# Patient Record
Sex: FEMALE | Race: BLACK OR AFRICAN AMERICAN | NOT HISPANIC OR LATINO | URBAN - METROPOLITAN AREA
[De-identification: names, ages, dates, MRNs, and addresses within clinical notes are randomized per-mention and may not be internally consistent; named-entity substitution may affect disease eponyms.]

---

## 2017-08-01 ENCOUNTER — EMERGENCY (EMERGENCY)
Facility: HOSPITAL | Age: 82
LOS: 0 days | Discharge: HOME | End: 2017-08-01
Attending: EMERGENCY MEDICINE
Payer: MEDICARE

## 2017-08-01 VITALS
HEART RATE: 66 BPM | OXYGEN SATURATION: 98 % | DIASTOLIC BLOOD PRESSURE: 52 MMHG | SYSTOLIC BLOOD PRESSURE: 155 MMHG | RESPIRATION RATE: 17 BRPM | WEIGHT: 169.98 LBS | HEIGHT: 68 IN

## 2017-08-01 PROBLEM — Z00.00 ENCOUNTER FOR PREVENTIVE HEALTH EXAMINATION: Status: ACTIVE | Noted: 2017-08-01

## 2017-08-01 LAB
ALBUMIN SERPL ELPH-MCNC: 4 G/DL — SIGNIFICANT CHANGE UP (ref 3.3–5)
ALP SERPL-CCNC: 58 U/L — SIGNIFICANT CHANGE UP (ref 40–120)
ALT FLD-CCNC: 31 U/L — SIGNIFICANT CHANGE UP (ref 12–78)
ANION GAP SERPL CALC-SCNC: 9 MMOL/L — SIGNIFICANT CHANGE UP (ref 5–17)
ANISOCYTOSIS BLD QL: SLIGHT — SIGNIFICANT CHANGE UP
APTT BLD: 29.5 SEC — SIGNIFICANT CHANGE UP (ref 27.5–37.4)
AST SERPL-CCNC: 37 U/L — SIGNIFICANT CHANGE UP (ref 15–37)
BASO STIPL BLD QL SMEAR: PRESENT — SIGNIFICANT CHANGE UP
BILIRUB SERPL-MCNC: 0.2 MG/DL — SIGNIFICANT CHANGE UP (ref 0.2–1.2)
BUN SERPL-MCNC: 46 MG/DL — HIGH (ref 7–23)
CALCIUM SERPL-MCNC: 9.4 MG/DL — SIGNIFICANT CHANGE UP (ref 8.5–10.1)
CHLORIDE SERPL-SCNC: 103 MMOL/L — SIGNIFICANT CHANGE UP (ref 96–108)
CO2 SERPL-SCNC: 26 MMOL/L — SIGNIFICANT CHANGE UP (ref 22–31)
CREAT SERPL-MCNC: 2.05 MG/DL — HIGH (ref 0.5–1.3)
GLUCOSE SERPL-MCNC: 154 MG/DL — HIGH (ref 70–99)
HCT VFR BLD CALC: 30.2 % — LOW (ref 34.5–45)
HGB BLD-MCNC: 9.7 G/DL — LOW (ref 11.5–15.5)
HYPOCHROMIA BLD QL: SLIGHT — SIGNIFICANT CHANGE UP
INR BLD: 1.01 RATIO — SIGNIFICANT CHANGE UP (ref 0.88–1.16)
LACTATE SERPL-SCNC: 1 MMOL/L — SIGNIFICANT CHANGE UP (ref 0.7–2)
LYMPHOCYTES # BLD AUTO: 30 % — SIGNIFICANT CHANGE UP (ref 13–44)
MCHC RBC-ENTMCNC: 30 PG — SIGNIFICANT CHANGE UP (ref 27–34)
MCHC RBC-ENTMCNC: 32.2 GM/DL — SIGNIFICANT CHANGE UP (ref 32–36)
MCV RBC AUTO: 93 FL — SIGNIFICANT CHANGE UP (ref 80–100)
MONOCYTES NFR BLD AUTO: 3 % — SIGNIFICANT CHANGE UP (ref 2–14)
NEUTROPHILS NFR BLD AUTO: 67 % — SIGNIFICANT CHANGE UP (ref 43–77)
NT-PROBNP SERPL-SCNC: 152 PG/ML — SIGNIFICANT CHANGE UP (ref 0–450)
PLAT MORPH BLD: NORMAL — SIGNIFICANT CHANGE UP
PLATELET # BLD AUTO: 207 K/UL — SIGNIFICANT CHANGE UP (ref 150–400)
POLYCHROMASIA BLD QL SMEAR: SLIGHT — SIGNIFICANT CHANGE UP
POTASSIUM SERPL-MCNC: 4.9 MMOL/L — SIGNIFICANT CHANGE UP (ref 3.5–5.3)
POTASSIUM SERPL-SCNC: 4.9 MMOL/L — SIGNIFICANT CHANGE UP (ref 3.5–5.3)
PROT SERPL-MCNC: 7.8 GM/DL — SIGNIFICANT CHANGE UP (ref 6–8.3)
PROTHROM AB SERPL-ACNC: 11 SEC — SIGNIFICANT CHANGE UP (ref 9.8–12.7)
RBC # BLD: 3.25 M/UL — LOW (ref 3.8–5.2)
RBC # FLD: 14.6 % — SIGNIFICANT CHANGE UP (ref 11–15)
RBC BLD AUTO: ABNORMAL
SCHISTOCYTES BLD QL AUTO: SLIGHT — SIGNIFICANT CHANGE UP
SODIUM SERPL-SCNC: 138 MMOL/L — SIGNIFICANT CHANGE UP (ref 135–145)
SPHEROCYTES BLD QL SMEAR: SLIGHT — SIGNIFICANT CHANGE UP
WBC # BLD: 7.5 K/UL — SIGNIFICANT CHANGE UP (ref 3.8–10.5)
WBC # FLD AUTO: 7.5 K/UL — SIGNIFICANT CHANGE UP (ref 3.8–10.5)

## 2017-08-01 PROCEDURE — 99284 EMERGENCY DEPT VISIT MOD MDM: CPT

## 2017-08-01 PROCEDURE — 71010: CPT | Mod: 26

## 2017-08-01 NOTE — ED PROVIDER NOTE - OBJECTIVE STATEMENT
81 yo female with diabetes, hypertension presents with cough for 2 weeks. Patient denies fever, chills, abdominal pain, recent travel, smoking. +yellow sputum

## 2017-08-01 NOTE — ED ADULT TRIAGE NOTE - CHIEF COMPLAINT QUOTE
cough with yellow sputum for 1 week. Pt denies any chest pain or sob at triage. Pt c/o cramps to feet

## 2017-08-01 NOTE — ED ADULT NURSE NOTE - OBJECTIVE STATEMENT
pt states she has cough for the past three days, denies any fever or chills or sob, cough with whitish sputum.

## 2017-08-01 NOTE — ED PROVIDER NOTE - MEDICAL DECISION MAKING DETAILS
Patient with cough for over one week, renal insufficiency; patient encouraged to f/u with pmd for re evaluation

## 2017-08-01 NOTE — ED ADULT NURSE NOTE - ADDITIONAL PRINTED INSTRUCTIONS GIVEN
pt discharged home by Md, denies any pain at present, ambulatory with steady gait accompanied home by family member

## 2017-08-02 DIAGNOSIS — I10 ESSENTIAL (PRIMARY) HYPERTENSION: ICD-10-CM

## 2017-08-02 DIAGNOSIS — R05 COUGH: ICD-10-CM

## 2017-08-02 DIAGNOSIS — N28.9 DISORDER OF KIDNEY AND URETER, UNSPECIFIED: ICD-10-CM

## 2017-08-02 DIAGNOSIS — J40 BRONCHITIS, NOT SPECIFIED AS ACUTE OR CHRONIC: ICD-10-CM

## 2017-08-02 DIAGNOSIS — E11.9 TYPE 2 DIABETES MELLITUS WITHOUT COMPLICATIONS: ICD-10-CM

## 2017-08-06 LAB
CULTURE RESULTS: SIGNIFICANT CHANGE UP
CULTURE RESULTS: SIGNIFICANT CHANGE UP
SPECIMEN SOURCE: SIGNIFICANT CHANGE UP
SPECIMEN SOURCE: SIGNIFICANT CHANGE UP

## 2017-09-04 ENCOUNTER — EMERGENCY (EMERGENCY)
Facility: HOSPITAL | Age: 82
LOS: 0 days | Discharge: HOME | End: 2017-09-04
Attending: EMERGENCY MEDICINE
Payer: MEDICARE

## 2017-09-04 VITALS
SYSTOLIC BLOOD PRESSURE: 145 MMHG | HEART RATE: 74 BPM | TEMPERATURE: 98 F | OXYGEN SATURATION: 97 % | RESPIRATION RATE: 16 BRPM | WEIGHT: 169.98 LBS | DIASTOLIC BLOOD PRESSURE: 52 MMHG | HEIGHT: 68 IN

## 2017-09-04 DIAGNOSIS — I10 ESSENTIAL (PRIMARY) HYPERTENSION: ICD-10-CM

## 2017-09-04 DIAGNOSIS — R05 COUGH: ICD-10-CM

## 2017-09-04 DIAGNOSIS — E11.9 TYPE 2 DIABETES MELLITUS WITHOUT COMPLICATIONS: ICD-10-CM

## 2017-09-04 PROCEDURE — 99284 EMERGENCY DEPT VISIT MOD MDM: CPT

## 2017-09-04 PROCEDURE — 71020: CPT | Mod: 26

## 2017-09-04 RX ORDER — CETIRIZINE HYDROCHLORIDE 10 MG/1
1 TABLET ORAL
Qty: 30 | Refills: 0 | OUTPATIENT
Start: 2017-09-04 | End: 2017-10-04

## 2017-09-04 RX ORDER — IPRATROPIUM/ALBUTEROL SULFATE 18-103MCG
3 AEROSOL WITH ADAPTER (GRAM) INHALATION ONCE
Qty: 0 | Refills: 0 | Status: COMPLETED | OUTPATIENT
Start: 2017-09-04 | End: 2017-09-04

## 2017-09-04 RX ADMIN — Medication 3 MILLILITER(S): at 14:47

## 2017-09-04 RX ADMIN — Medication 60 MILLIGRAM(S): at 14:45

## 2017-09-04 NOTE — ED ADULT TRIAGE NOTE - CHIEF COMPLAINT QUOTE
Stated " I'm here for constant coughing , its sometimes green , sometimes yellow "   denies fever  or chest pain or sob

## 2017-11-06 ENCOUNTER — INPATIENT (INPATIENT)
Facility: HOSPITAL | Age: 82
LOS: 3 days | Discharge: HOME HEALTH | End: 2017-11-10
Attending: INTERNAL MEDICINE | Admitting: INTERNAL MEDICINE
Payer: MEDICARE

## 2017-11-06 VITALS
TEMPERATURE: 102 F | WEIGHT: 179.9 LBS | RESPIRATION RATE: 28 BRPM | HEIGHT: 68 IN | SYSTOLIC BLOOD PRESSURE: 148 MMHG | HEART RATE: 81 BPM | DIASTOLIC BLOOD PRESSURE: 51 MMHG | OXYGEN SATURATION: 97 %

## 2017-11-06 DIAGNOSIS — N18.9 CHRONIC KIDNEY DISEASE, UNSPECIFIED: ICD-10-CM

## 2017-11-06 DIAGNOSIS — N17.9 ACUTE KIDNEY FAILURE, UNSPECIFIED: ICD-10-CM

## 2017-11-06 DIAGNOSIS — E11.9 TYPE 2 DIABETES MELLITUS WITHOUT COMPLICATIONS: ICD-10-CM

## 2017-11-06 DIAGNOSIS — A41.9 SEPSIS, UNSPECIFIED ORGANISM: ICD-10-CM

## 2017-11-06 DIAGNOSIS — I10 ESSENTIAL (PRIMARY) HYPERTENSION: ICD-10-CM

## 2017-11-06 LAB
ALBUMIN SERPL ELPH-MCNC: 3.3 G/DL — SIGNIFICANT CHANGE UP (ref 3.3–5)
ALBUMIN SERPL ELPH-MCNC: 3.6 G/DL — SIGNIFICANT CHANGE UP (ref 3.3–5)
ALP SERPL-CCNC: 49 U/L — SIGNIFICANT CHANGE UP (ref 40–120)
ALP SERPL-CCNC: 51 U/L — SIGNIFICANT CHANGE UP (ref 40–120)
ALT FLD-CCNC: 23 U/L — SIGNIFICANT CHANGE UP (ref 12–78)
ALT FLD-CCNC: 28 U/L — SIGNIFICANT CHANGE UP (ref 12–78)
ANION GAP SERPL CALC-SCNC: 11 MMOL/L — SIGNIFICANT CHANGE UP (ref 5–17)
ANION GAP SERPL CALC-SCNC: 7 MMOL/L — SIGNIFICANT CHANGE UP (ref 5–17)
ANISOCYTOSIS BLD QL: SLIGHT — SIGNIFICANT CHANGE UP
APPEARANCE UR: CLEAR — SIGNIFICANT CHANGE UP
AST SERPL-CCNC: 23 U/L — SIGNIFICANT CHANGE UP (ref 15–37)
AST SERPL-CCNC: 30 U/L — SIGNIFICANT CHANGE UP (ref 15–37)
BACTERIA # UR AUTO: ABNORMAL
BASE EXCESS BLDA CALC-SCNC: -1.6 MMOL/L — SIGNIFICANT CHANGE UP (ref -2–2)
BILIRUB SERPL-MCNC: 0.4 MG/DL — SIGNIFICANT CHANGE UP (ref 0.2–1.2)
BILIRUB SERPL-MCNC: 0.4 MG/DL — SIGNIFICANT CHANGE UP (ref 0.2–1.2)
BILIRUB UR-MCNC: NEGATIVE — SIGNIFICANT CHANGE UP
BLOOD GAS COMMENTS: SIGNIFICANT CHANGE UP
BLOOD GAS COMMENTS: SIGNIFICANT CHANGE UP
BLOOD GAS SOURCE: SIGNIFICANT CHANGE UP
BUN SERPL-MCNC: 37 MG/DL — HIGH (ref 7–23)
BUN SERPL-MCNC: 46 MG/DL — HIGH (ref 7–23)
CALCIUM SERPL-MCNC: 8.2 MG/DL — LOW (ref 8.5–10.1)
CALCIUM SERPL-MCNC: 8.9 MG/DL — SIGNIFICANT CHANGE UP (ref 8.5–10.1)
CHLORIDE SERPL-SCNC: 101 MMOL/L — SIGNIFICANT CHANGE UP (ref 96–108)
CHLORIDE SERPL-SCNC: 104 MMOL/L — SIGNIFICANT CHANGE UP (ref 96–108)
CK MB BLD-MCNC: 0.9 % — SIGNIFICANT CHANGE UP (ref 0–3.5)
CK MB CFR SERPL CALC: 1.6 NG/ML — SIGNIFICANT CHANGE UP (ref 0.5–3.6)
CK MB CFR SERPL CALC: <0.5 NG/ML — SIGNIFICANT CHANGE UP (ref 0.5–3.6)
CK SERPL-CCNC: 181 U/L — SIGNIFICANT CHANGE UP (ref 26–192)
CO2 SERPL-SCNC: 24 MMOL/L — SIGNIFICANT CHANGE UP (ref 22–31)
CO2 SERPL-SCNC: 28 MMOL/L — SIGNIFICANT CHANGE UP (ref 22–31)
COLOR SPEC: YELLOW — SIGNIFICANT CHANGE UP
CREAT SERPL-MCNC: 2.13 MG/DL — HIGH (ref 0.5–1.3)
CREAT SERPL-MCNC: 2.23 MG/DL — HIGH (ref 0.5–1.3)
DIFF PNL FLD: ABNORMAL
EPI CELLS # UR: SIGNIFICANT CHANGE UP
FLUAV SPEC QL CULT: NEGATIVE — SIGNIFICANT CHANGE UP
FLUBV AG SPEC QL IA: NEGATIVE — SIGNIFICANT CHANGE UP
GLUCOSE BLDC GLUCOMTR-MCNC: 113 MG/DL — HIGH (ref 70–99)
GLUCOSE BLDC GLUCOMTR-MCNC: 133 MG/DL — HIGH (ref 70–99)
GLUCOSE BLDC GLUCOMTR-MCNC: 150 MG/DL — HIGH (ref 70–99)
GLUCOSE BLDC GLUCOMTR-MCNC: 198 MG/DL — HIGH (ref 70–99)
GLUCOSE SERPL-MCNC: 121 MG/DL — HIGH (ref 70–99)
GLUCOSE SERPL-MCNC: 162 MG/DL — HIGH (ref 70–99)
GLUCOSE UR QL: NEGATIVE MG/DL — SIGNIFICANT CHANGE UP
HCO3 BLDA-SCNC: 21 MMOL/L — SIGNIFICANT CHANGE UP (ref 21–29)
HCT VFR BLD CALC: 28.5 % — LOW (ref 34.5–45)
HCT VFR BLD CALC: 30.2 % — LOW (ref 34.5–45)
HGB BLD-MCNC: 9.3 G/DL — LOW (ref 11.5–15.5)
HGB BLD-MCNC: 9.8 G/DL — LOW (ref 11.5–15.5)
HOROWITZ INDEX BLDA+IHG-RTO: 32 — SIGNIFICANT CHANGE UP
HYPOCHROMIA BLD QL: SLIGHT — SIGNIFICANT CHANGE UP
KETONES UR-MCNC: NEGATIVE — SIGNIFICANT CHANGE UP
LACTATE SERPL-SCNC: 0.9 MMOL/L — SIGNIFICANT CHANGE UP (ref 0.7–2)
LACTATE SERPL-SCNC: 2.4 MMOL/L — HIGH (ref 0.7–2)
LACTATE SERPL-SCNC: 2.7 MMOL/L — HIGH (ref 0.7–2)
LEUKOCYTE ESTERASE UR-ACNC: ABNORMAL
LIDOCAIN IGE QN: 155 U/L — SIGNIFICANT CHANGE UP (ref 73–393)
LIDOCAIN IGE QN: 158 U/L — SIGNIFICANT CHANGE UP (ref 73–393)
LYMPHOCYTES # BLD AUTO: 7 % — LOW (ref 13–44)
MACROCYTES BLD QL: SLIGHT — SIGNIFICANT CHANGE UP
MAGNESIUM SERPL-MCNC: 2 MG/DL — SIGNIFICANT CHANGE UP (ref 1.6–2.6)
MAGNESIUM SERPL-MCNC: 2.3 MG/DL — SIGNIFICANT CHANGE UP (ref 1.6–2.6)
MCHC RBC-ENTMCNC: 30 PG — SIGNIFICANT CHANGE UP (ref 27–34)
MCHC RBC-ENTMCNC: 30.1 PG — SIGNIFICANT CHANGE UP (ref 27–34)
MCHC RBC-ENTMCNC: 32.4 GM/DL — SIGNIFICANT CHANGE UP (ref 32–36)
MCHC RBC-ENTMCNC: 32.8 GM/DL — SIGNIFICANT CHANGE UP (ref 32–36)
MCV RBC AUTO: 91.8 FL — SIGNIFICANT CHANGE UP (ref 80–100)
MCV RBC AUTO: 92.6 FL — SIGNIFICANT CHANGE UP (ref 80–100)
MONOCYTES NFR BLD AUTO: 3 % — SIGNIFICANT CHANGE UP (ref 2–14)
NEUTROPHILS NFR BLD AUTO: 85 % — HIGH (ref 43–77)
NEUTS BAND # BLD: 5 % — SIGNIFICANT CHANGE UP (ref 0–8)
NITRITE UR-MCNC: NEGATIVE — SIGNIFICANT CHANGE UP
OVALOCYTES BLD QL SMEAR: SLIGHT — SIGNIFICANT CHANGE UP
PCO2 BLDA: 29 MMHG — LOW (ref 32–46)
PH BLD: 7.47 — HIGH (ref 7.35–7.45)
PH UR: 7 — SIGNIFICANT CHANGE UP (ref 5–8)
PHOSPHATE SERPL-MCNC: 2.5 MG/DL — SIGNIFICANT CHANGE UP (ref 2.5–4.5)
PLAT MORPH BLD: NORMAL — SIGNIFICANT CHANGE UP
PLATELET # BLD AUTO: 146 K/UL — LOW (ref 150–400)
PLATELET # BLD AUTO: 161 K/UL — SIGNIFICANT CHANGE UP (ref 150–400)
PO2 BLDA: 101 MMHG — SIGNIFICANT CHANGE UP (ref 74–108)
POLYCHROMASIA BLD QL SMEAR: SLIGHT — SIGNIFICANT CHANGE UP
POTASSIUM SERPL-MCNC: 3.8 MMOL/L — SIGNIFICANT CHANGE UP (ref 3.5–5.3)
POTASSIUM SERPL-MCNC: 4.1 MMOL/L — SIGNIFICANT CHANGE UP (ref 3.5–5.3)
POTASSIUM SERPL-SCNC: 3.8 MMOL/L — SIGNIFICANT CHANGE UP (ref 3.5–5.3)
POTASSIUM SERPL-SCNC: 4.1 MMOL/L — SIGNIFICANT CHANGE UP (ref 3.5–5.3)
PROT SERPL-MCNC: 7.4 GM/DL — SIGNIFICANT CHANGE UP (ref 6–8.3)
PROT SERPL-MCNC: 7.5 GM/DL — SIGNIFICANT CHANGE UP (ref 6–8.3)
PROT UR-MCNC: 30 MG/DL
RBC # BLD: 3.1 M/UL — LOW (ref 3.8–5.2)
RBC # BLD: 3.26 M/UL — LOW (ref 3.8–5.2)
RBC # FLD: 15.1 % — HIGH (ref 11–15)
RBC # FLD: 15.1 % — HIGH (ref 11–15)
RBC BLD AUTO: ABNORMAL
RBC CASTS # UR COMP ASSIST: SIGNIFICANT CHANGE UP /HPF (ref 0–4)
SAO2 % BLDA: 98 % — HIGH (ref 92–96)
SODIUM SERPL-SCNC: 136 MMOL/L — SIGNIFICANT CHANGE UP (ref 135–145)
SODIUM SERPL-SCNC: 139 MMOL/L — SIGNIFICANT CHANGE UP (ref 135–145)
SP GR SPEC: 1 — LOW (ref 1.01–1.02)
TROPONIN I SERPL-MCNC: 0.11 NG/ML — HIGH (ref 0.01–0.04)
TROPONIN I SERPL-MCNC: 0.74 NG/ML — HIGH (ref 0.01–0.04)
TROPONIN I SERPL-MCNC: 0.94 NG/ML — HIGH (ref 0.01–0.04)
UROBILINOGEN FLD QL: NEGATIVE MG/DL — SIGNIFICANT CHANGE UP
WBC # BLD: 8.6 K/UL — SIGNIFICANT CHANGE UP (ref 3.8–10.5)
WBC # BLD: 8.9 K/UL — SIGNIFICANT CHANGE UP (ref 3.8–10.5)
WBC # FLD AUTO: 8.6 K/UL — SIGNIFICANT CHANGE UP (ref 3.8–10.5)
WBC # FLD AUTO: 8.9 K/UL — SIGNIFICANT CHANGE UP (ref 3.8–10.5)
WBC UR QL: ABNORMAL

## 2017-11-06 PROCEDURE — 99285 EMERGENCY DEPT VISIT HI MDM: CPT | Mod: 25

## 2017-11-06 PROCEDURE — 99223 1ST HOSP IP/OBS HIGH 75: CPT

## 2017-11-06 PROCEDURE — 71010: CPT | Mod: 26

## 2017-11-06 PROCEDURE — 93010 ELECTROCARDIOGRAM REPORT: CPT

## 2017-11-06 RX ORDER — ASPIRIN/CALCIUM CARB/MAGNESIUM 324 MG
325 TABLET ORAL ONCE
Qty: 0 | Refills: 0 | Status: COMPLETED | OUTPATIENT
Start: 2017-11-06 | End: 2017-11-06

## 2017-11-06 RX ORDER — SODIUM CHLORIDE 9 MG/ML
1000 INJECTION INTRAMUSCULAR; INTRAVENOUS; SUBCUTANEOUS
Qty: 0 | Refills: 0 | Status: DISCONTINUED | OUTPATIENT
Start: 2017-11-06 | End: 2017-11-08

## 2017-11-06 RX ORDER — ACETAMINOPHEN 500 MG
650 TABLET ORAL ONCE
Qty: 0 | Refills: 0 | Status: COMPLETED | OUTPATIENT
Start: 2017-11-06 | End: 2017-11-06

## 2017-11-06 RX ORDER — GABAPENTIN 400 MG/1
600 CAPSULE ORAL
Qty: 0 | Refills: 0 | Status: DISCONTINUED | OUTPATIENT
Start: 2017-11-06 | End: 2017-11-10

## 2017-11-06 RX ORDER — INSULIN LISPRO 100/ML
VIAL (ML) SUBCUTANEOUS
Qty: 0 | Refills: 0 | Status: DISCONTINUED | OUTPATIENT
Start: 2017-11-06 | End: 2017-11-10

## 2017-11-06 RX ORDER — DEXTROSE 50 % IN WATER 50 %
25 SYRINGE (ML) INTRAVENOUS ONCE
Qty: 0 | Refills: 0 | Status: DISCONTINUED | OUTPATIENT
Start: 2017-11-06 | End: 2017-11-10

## 2017-11-06 RX ORDER — PIPERACILLIN AND TAZOBACTAM 4; .5 G/20ML; G/20ML
3.38 INJECTION, POWDER, LYOPHILIZED, FOR SOLUTION INTRAVENOUS ONCE
Qty: 0 | Refills: 0 | Status: COMPLETED | OUTPATIENT
Start: 2017-11-06 | End: 2017-11-06

## 2017-11-06 RX ORDER — AMLODIPINE BESYLATE 2.5 MG/1
5 TABLET ORAL DAILY
Qty: 0 | Refills: 0 | Status: DISCONTINUED | OUTPATIENT
Start: 2017-11-06 | End: 2017-11-06

## 2017-11-06 RX ORDER — VANCOMYCIN HCL 1 G
1000 VIAL (EA) INTRAVENOUS ONCE
Qty: 0 | Refills: 0 | Status: COMPLETED | OUTPATIENT
Start: 2017-11-06 | End: 2017-11-06

## 2017-11-06 RX ORDER — SODIUM CHLORIDE 9 MG/ML
1000 INJECTION, SOLUTION INTRAVENOUS
Qty: 0 | Refills: 0 | Status: DISCONTINUED | OUTPATIENT
Start: 2017-11-06 | End: 2017-11-10

## 2017-11-06 RX ORDER — AMLODIPINE BESYLATE 2.5 MG/1
5 TABLET ORAL DAILY
Qty: 0 | Refills: 0 | Status: DISCONTINUED | OUTPATIENT
Start: 2017-11-06 | End: 2017-11-10

## 2017-11-06 RX ORDER — INSULIN LISPRO 100/ML
VIAL (ML) SUBCUTANEOUS AT BEDTIME
Qty: 0 | Refills: 0 | Status: DISCONTINUED | OUTPATIENT
Start: 2017-11-06 | End: 2017-11-10

## 2017-11-06 RX ORDER — PIPERACILLIN AND TAZOBACTAM 4; .5 G/20ML; G/20ML
3.38 INJECTION, POWDER, LYOPHILIZED, FOR SOLUTION INTRAVENOUS EVERY 12 HOURS
Qty: 0 | Refills: 0 | Status: DISCONTINUED | OUTPATIENT
Start: 2017-11-06 | End: 2017-11-08

## 2017-11-06 RX ORDER — LOSARTAN POTASSIUM 100 MG/1
100 TABLET, FILM COATED ORAL DAILY
Qty: 0 | Refills: 0 | Status: DISCONTINUED | OUTPATIENT
Start: 2017-11-06 | End: 2017-11-10

## 2017-11-06 RX ORDER — GLUCAGON INJECTION, SOLUTION 0.5 MG/.1ML
1 INJECTION, SOLUTION SUBCUTANEOUS ONCE
Qty: 0 | Refills: 0 | Status: DISCONTINUED | OUTPATIENT
Start: 2017-11-06 | End: 2017-11-10

## 2017-11-06 RX ORDER — HEPARIN SODIUM 5000 [USP'U]/ML
5000 INJECTION INTRAVENOUS; SUBCUTANEOUS EVERY 12 HOURS
Qty: 0 | Refills: 0 | Status: DISCONTINUED | OUTPATIENT
Start: 2017-11-06 | End: 2017-11-10

## 2017-11-06 RX ORDER — SODIUM CHLORIDE 9 MG/ML
2500 INJECTION INTRAMUSCULAR; INTRAVENOUS; SUBCUTANEOUS ONCE
Qty: 0 | Refills: 0 | Status: COMPLETED | OUTPATIENT
Start: 2017-11-06 | End: 2017-11-06

## 2017-11-06 RX ORDER — INFLUENZA VIRUS VACCINE 15; 15; 15; 15 UG/.5ML; UG/.5ML; UG/.5ML; UG/.5ML
0.5 SUSPENSION INTRAMUSCULAR ONCE
Qty: 0 | Refills: 0 | Status: COMPLETED | OUTPATIENT
Start: 2017-11-06 | End: 2017-11-10

## 2017-11-06 RX ORDER — ACETAMINOPHEN 500 MG
650 TABLET ORAL EVERY 6 HOURS
Qty: 0 | Refills: 0 | Status: DISCONTINUED | OUTPATIENT
Start: 2017-11-06 | End: 2017-11-10

## 2017-11-06 RX ORDER — ASPIRIN/CALCIUM CARB/MAGNESIUM 324 MG
81 TABLET ORAL DAILY
Qty: 0 | Refills: 0 | Status: DISCONTINUED | OUTPATIENT
Start: 2017-11-06 | End: 2017-11-10

## 2017-11-06 RX ORDER — DEXTROSE 50 % IN WATER 50 %
12.5 SYRINGE (ML) INTRAVENOUS ONCE
Qty: 0 | Refills: 0 | Status: DISCONTINUED | OUTPATIENT
Start: 2017-11-06 | End: 2017-11-10

## 2017-11-06 RX ORDER — LOSARTAN POTASSIUM 100 MG/1
100 TABLET, FILM COATED ORAL DAILY
Qty: 0 | Refills: 0 | Status: DISCONTINUED | OUTPATIENT
Start: 2017-11-06 | End: 2017-11-06

## 2017-11-06 RX ORDER — ACETAMINOPHEN 500 MG
1000 TABLET ORAL ONCE
Qty: 0 | Refills: 0 | Status: COMPLETED | OUTPATIENT
Start: 2017-11-06 | End: 2017-11-06

## 2017-11-06 RX ORDER — PENTOXIFYLLINE 400 MG
400 TABLET, EXTENDED RELEASE ORAL THREE TIMES A DAY
Qty: 0 | Refills: 0 | Status: DISCONTINUED | OUTPATIENT
Start: 2017-11-06 | End: 2017-11-10

## 2017-11-06 RX ORDER — DEXTROSE 50 % IN WATER 50 %
1 SYRINGE (ML) INTRAVENOUS ONCE
Qty: 0 | Refills: 0 | Status: DISCONTINUED | OUTPATIENT
Start: 2017-11-06 | End: 2017-11-10

## 2017-11-06 RX ADMIN — AMLODIPINE BESYLATE 5 MILLIGRAM(S): 2.5 TABLET ORAL at 18:41

## 2017-11-06 RX ADMIN — Medication 250 MILLIGRAM(S): at 11:14

## 2017-11-06 RX ADMIN — HEPARIN SODIUM 5000 UNIT(S): 5000 INJECTION INTRAVENOUS; SUBCUTANEOUS at 18:40

## 2017-11-06 RX ADMIN — SODIUM CHLORIDE 100 MILLILITER(S): 9 INJECTION INTRAMUSCULAR; INTRAVENOUS; SUBCUTANEOUS at 23:28

## 2017-11-06 RX ADMIN — Medication 400 MILLIGRAM(S): at 21:50

## 2017-11-06 RX ADMIN — PIPERACILLIN AND TAZOBACTAM 200 GRAM(S): 4; .5 INJECTION, POWDER, LYOPHILIZED, FOR SOLUTION INTRAVENOUS at 09:59

## 2017-11-06 RX ADMIN — Medication 650 MILLIGRAM(S): at 08:29

## 2017-11-06 RX ADMIN — Medication 400 MILLIGRAM(S): at 13:53

## 2017-11-06 RX ADMIN — SODIUM CHLORIDE 1250 MILLILITER(S): 9 INJECTION INTRAMUSCULAR; INTRAVENOUS; SUBCUTANEOUS at 08:00

## 2017-11-06 RX ADMIN — SODIUM CHLORIDE 100 MILLILITER(S): 9 INJECTION INTRAMUSCULAR; INTRAVENOUS; SUBCUTANEOUS at 13:52

## 2017-11-06 RX ADMIN — Medication 81 MILLIGRAM(S): at 18:41

## 2017-11-06 RX ADMIN — Medication 325 MILLIGRAM(S): at 09:59

## 2017-11-06 RX ADMIN — Medication: at 18:40

## 2017-11-06 RX ADMIN — GABAPENTIN 600 MILLIGRAM(S): 400 CAPSULE ORAL at 18:39

## 2017-11-06 RX ADMIN — Medication 400 MILLIGRAM(S): at 23:22

## 2017-11-06 RX ADMIN — PIPERACILLIN AND TAZOBACTAM 25 GRAM(S): 4; .5 INJECTION, POWDER, LYOPHILIZED, FOR SOLUTION INTRAVENOUS at 17:30

## 2017-11-06 NOTE — ED PROVIDER NOTE - PROGRESS NOTE DETAILS
Pt is more alert after fluids. ST depression seen on monitor, repeat ecg shows St depressions. Transfer center Cardiology paged for evolving ECG and trop 0.113. Given creatinine level and infectious source, cardiology recommends trending ecg and troponins for likely demand ischemia, no need for urgent transfer or cath at this time. Pt admitted to medicine for further management.

## 2017-11-06 NOTE — H&P ADULT - NSHPREVIEWOFSYSTEMS_GEN_ALL_CORE
CONSTITUTIONAL: No fever, weight loss, or fatigue  EYES: No eye pain, visual disturbances, or discharge  ENMT:  No difficulty hearing, tinnitus, vertigo; No sinus or throat pain  NECK: No pain or stiffness  RESPIRATORY: No cough, wheezing, chills or hemoptysis; No shortness of breath  CARDIOVASCULAR: two days of  chest pain,no  palpitations, dizziness, or leg swelling  GASTROINTESTINAL: No abdominal or epigastric pain. No nausea, vomiting, or hematemesis; No diarrhea or constipation. No melena or hematochezia.  GENITOURINARY: No dysuria, frequency, hematuria, or incontinence  NEUROLOGICAL: No headaches, memory loss, loss of strength, numbness, or tremors  SKIN: No itching, burning, rashes, or lesions   LYMPH NODES: No enlarged glands  ENDOCRINE: No heat or cold intolerance; No hair loss  MUSCULOSKELETAL: No joint pain or swelling; No muscle, back, or extremity pain  PSYCHIATRIC: No depression, anxiety, mood swings, or difficulty sleeping  HEME/LYMPH: No easy bruising, or bleeding gums  ALLERGY AND IMMUNOLOGIC: No hives or eczema

## 2017-11-06 NOTE — ED PROVIDER NOTE - OBJECTIVE STATEMENT
Pt is an 81 yo lady with a pmhx of HTN, HL, IDDM2 who presents to the ED with cough, and fever since yesterday. Has had chest pain from yesterday as well, on L. chest, nonradiating 7/10. No hx of dvt/pe, no leg swelling. Daughter says pt is less responsive. Fever at home, took tylenol yesterday. No abdominal pain, no difficulty breathing. Coughing, nonproductive. No dysuria, no hx of CAD.

## 2017-11-06 NOTE — PHARMACY COMMUNICATION NOTE - COMMENTS
MD wants 600mg bid. I explained the crcl and scr and according to utdol should be 200-700mg daily. MD wants 600mg bid and says that that her normal renal function is at baseline for her and wants order proceeses.

## 2017-11-06 NOTE — H&P ADULT - NSHPLABSRESULTS_GEN_ALL_CORE
9.8    8.9   )-----------( 161      ( 06 Nov 2017 08:17 )             30.2   11-06    136  |  101  |  46<H>  ----------------------------<  162<H>  4.1   |  28  |  2.23<H>    Ca    8.9      06 Nov 2017 08:17  Mg     2.3     11-06    TPro  7.5  /  Alb  3.6  /  TBili  0.4  /  DBili  x   /  AST  23  /  ALT  23  /  AlkPhos  51  11-06  Urine Microscopic-Add On (NC) (11.06.17 @ 09:21)    Bacteria: Moderate    Epithelial Cells: Few    Red Blood Cell - Urine: 0-2 /HPF    White Blood Cell - Urine: 6-10  < from: Xray Chest 1 View AP/PA. (11.06.17 @ 08:33) >    Cardiomegaly. Clear lungs.

## 2017-11-06 NOTE — CHART NOTE - NSCHARTNOTEFT_GEN_A_CORE
Medicine Hospitalist PA    81 y/o female admitted 11/06/2017 for sepsis. RRT called for hypoxemia at 85%. Upon arrival, patient was complained of having chills with RUQ pain 2/2 to chills. Denied headache, chest pain, SOB.     Vital rechecked  T: 100.3F rectal  BP: 181/41  HR: 90  RR: 24  Pulse Ox: 85%    General: A+Ox3, shaking chills  Cardio: S1S2 regular rate/rhythm  Resp: Tachypneic. Good air entry B/L. No rales, rhonchi, wheezes.  Abd: Soft NTND +BS    A&P  81 y/o female a pmhx of HTN, HL, IDDM2 admitted with sepsis 2/2 ?UTI.   -nasal cannula @3L started  -IV tylenol started   -resolved chills and RUQ pain  -labs ordered. f/u results  -trops elevated. f/u cardiac enzymes  -ABG to confirm oxygenation/saturation at 98%  -BCx and UCx pending

## 2017-11-06 NOTE — H&P ADULT - HISTORY OF PRESENT ILLNESS
Pt is an 83 yo lady with a pmhx of HTN, HL, IDDM2 who presents to the ED with cough, and fever since yesterday. Has had chest pain from yesterday as well, on L. chest, nonradiating 7/10. No hx of dvt/pe, no leg swelling. Daughter says pt is less responsive. Fever at home, took tylenol yesterday. No abdominal pain, no difficulty breathing. Coughing, nonproductive. No dysuria, no hx of CAD.

## 2017-11-06 NOTE — H&P ADULT - ASSESSMENT
82f with fever and episode of lethargy who has responded to fluids and antibiotics   IMPROVE VTE Individual Risk Assessment          RISK                                                          Points    [  ] Previous VTE                                                3    [  ] Thrombophilia                                             2    [  ] Lower limb paralysis                                    2        (unable to hold up >15 seconds)      [  ] Current Cancer                                             2         (within 6 months)    [  ] Immobilization > 24 hrs                              1  x  [  ] ICU/CCU stay > 24 hours                            1    [ x ] Age > 60                                                    1    IMPROVE VTE Score  ____2_____

## 2017-11-07 DIAGNOSIS — I25.9 CHRONIC ISCHEMIC HEART DISEASE, UNSPECIFIED: ICD-10-CM

## 2017-11-07 DIAGNOSIS — R78.81 BACTEREMIA: ICD-10-CM

## 2017-11-07 DIAGNOSIS — R53.1 WEAKNESS: ICD-10-CM

## 2017-11-07 DIAGNOSIS — A41.51 SEPSIS DUE TO ESCHERICHIA COLI [E. COLI]: ICD-10-CM

## 2017-11-07 DIAGNOSIS — Z29.9 ENCOUNTER FOR PROPHYLACTIC MEASURES, UNSPECIFIED: ICD-10-CM

## 2017-11-07 DIAGNOSIS — N30.01 ACUTE CYSTITIS WITH HEMATURIA: ICD-10-CM

## 2017-11-07 DIAGNOSIS — N30.00 ACUTE CYSTITIS WITHOUT HEMATURIA: ICD-10-CM

## 2017-11-07 LAB
ANION GAP SERPL CALC-SCNC: 8 MMOL/L — SIGNIFICANT CHANGE UP (ref 5–17)
BUN SERPL-MCNC: 28 MG/DL — HIGH (ref 7–23)
CALCIUM SERPL-MCNC: 8.5 MG/DL — SIGNIFICANT CHANGE UP (ref 8.5–10.1)
CHLORIDE SERPL-SCNC: 105 MMOL/L — SIGNIFICANT CHANGE UP (ref 96–108)
CO2 SERPL-SCNC: 25 MMOL/L — SIGNIFICANT CHANGE UP (ref 22–31)
CREAT SERPL-MCNC: 1.69 MG/DL — HIGH (ref 0.5–1.3)
E COLI DNA BLD POS QL NAA+NON-PROBE: SIGNIFICANT CHANGE UP
GLUCOSE BLDC GLUCOMTR-MCNC: 110 MG/DL — HIGH (ref 70–99)
GLUCOSE BLDC GLUCOMTR-MCNC: 148 MG/DL — HIGH (ref 70–99)
GLUCOSE BLDC GLUCOMTR-MCNC: 167 MG/DL — HIGH (ref 70–99)
GLUCOSE BLDC GLUCOMTR-MCNC: 169 MG/DL — HIGH (ref 70–99)
GLUCOSE SERPL-MCNC: 130 MG/DL — HIGH (ref 70–99)
GRAM STN FLD: SIGNIFICANT CHANGE UP
HBA1C BLD-MCNC: 6.5 % — HIGH (ref 4–5.6)
HCT VFR BLD CALC: 29.4 % — LOW (ref 34.5–45)
HGB BLD-MCNC: 9.6 G/DL — LOW (ref 11.5–15.5)
MCHC RBC-ENTMCNC: 29.9 PG — SIGNIFICANT CHANGE UP (ref 27–34)
MCHC RBC-ENTMCNC: 32.7 GM/DL — SIGNIFICANT CHANGE UP (ref 32–36)
MCV RBC AUTO: 91.3 FL — SIGNIFICANT CHANGE UP (ref 80–100)
METHOD TYPE: SIGNIFICANT CHANGE UP
PLATELET # BLD AUTO: 172 K/UL — SIGNIFICANT CHANGE UP (ref 150–400)
POTASSIUM SERPL-MCNC: 3.7 MMOL/L — SIGNIFICANT CHANGE UP (ref 3.5–5.3)
POTASSIUM SERPL-SCNC: 3.7 MMOL/L — SIGNIFICANT CHANGE UP (ref 3.5–5.3)
PROCALCITONIN SERPL-MCNC: 14.89 NG/ML — HIGH (ref 0–0.04)
RBC # BLD: 3.21 M/UL — LOW (ref 3.8–5.2)
RBC # FLD: 15 % — SIGNIFICANT CHANGE UP (ref 11–15)
SODIUM SERPL-SCNC: 138 MMOL/L — SIGNIFICANT CHANGE UP (ref 135–145)
SPECIMEN SOURCE: SIGNIFICANT CHANGE UP
TROPONIN I SERPL-MCNC: 0.85 NG/ML — HIGH (ref 0.01–0.04)
TROPONIN I SERPL-MCNC: 1.03 NG/ML — HIGH (ref 0.01–0.04)
WBC # BLD: 8.3 K/UL — SIGNIFICANT CHANGE UP (ref 3.8–10.5)
WBC # FLD AUTO: 8.3 K/UL — SIGNIFICANT CHANGE UP (ref 3.8–10.5)

## 2017-11-07 PROCEDURE — 99223 1ST HOSP IP/OBS HIGH 75: CPT

## 2017-11-07 PROCEDURE — 99233 SBSQ HOSP IP/OBS HIGH 50: CPT

## 2017-11-07 PROCEDURE — 99232 SBSQ HOSP IP/OBS MODERATE 35: CPT

## 2017-11-07 PROCEDURE — 76700 US EXAM ABDOM COMPLETE: CPT | Mod: 26

## 2017-11-07 RX ORDER — VANCOMYCIN HCL 1 G
1250 VIAL (EA) INTRAVENOUS ONCE
Qty: 0 | Refills: 0 | Status: COMPLETED | OUTPATIENT
Start: 2017-11-07 | End: 2017-11-07

## 2017-11-07 RX ORDER — METOPROLOL TARTRATE 50 MG
25 TABLET ORAL
Qty: 0 | Refills: 0 | Status: DISCONTINUED | OUTPATIENT
Start: 2017-11-07 | End: 2017-11-10

## 2017-11-07 RX ORDER — VANCOMYCIN HCL 1 G
VIAL (EA) INTRAVENOUS
Qty: 0 | Refills: 0 | Status: DISCONTINUED | OUTPATIENT
Start: 2017-11-07 | End: 2017-11-07

## 2017-11-07 RX ORDER — VANCOMYCIN HCL 1 G
1250 VIAL (EA) INTRAVENOUS ONCE
Qty: 0 | Refills: 0 | Status: DISCONTINUED | OUTPATIENT
Start: 2017-11-07 | End: 2017-11-07

## 2017-11-07 RX ORDER — VANCOMYCIN HCL 1 G
VIAL (EA) INTRAVENOUS
Qty: 0 | Refills: 0 | Status: DISCONTINUED | OUTPATIENT
Start: 2017-11-07 | End: 2017-11-08

## 2017-11-07 RX ADMIN — Medication 81 MILLIGRAM(S): at 11:26

## 2017-11-07 RX ADMIN — PIPERACILLIN AND TAZOBACTAM 25 GRAM(S): 4; .5 INJECTION, POWDER, LYOPHILIZED, FOR SOLUTION INTRAVENOUS at 06:39

## 2017-11-07 RX ADMIN — AMLODIPINE BESYLATE 5 MILLIGRAM(S): 2.5 TABLET ORAL at 06:41

## 2017-11-07 RX ADMIN — HEPARIN SODIUM 5000 UNIT(S): 5000 INJECTION INTRAVENOUS; SUBCUTANEOUS at 06:40

## 2017-11-07 RX ADMIN — HEPARIN SODIUM 5000 UNIT(S): 5000 INJECTION INTRAVENOUS; SUBCUTANEOUS at 17:40

## 2017-11-07 RX ADMIN — LOSARTAN POTASSIUM 100 MILLIGRAM(S): 100 TABLET, FILM COATED ORAL at 06:40

## 2017-11-07 RX ADMIN — PIPERACILLIN AND TAZOBACTAM 25 GRAM(S): 4; .5 INJECTION, POWDER, LYOPHILIZED, FOR SOLUTION INTRAVENOUS at 18:40

## 2017-11-07 RX ADMIN — Medication 650 MILLIGRAM(S): at 16:42

## 2017-11-07 RX ADMIN — Medication 166.67 MILLIGRAM(S): at 17:39

## 2017-11-07 RX ADMIN — GABAPENTIN 600 MILLIGRAM(S): 400 CAPSULE ORAL at 17:40

## 2017-11-07 RX ADMIN — Medication 400 MILLIGRAM(S): at 13:53

## 2017-11-07 RX ADMIN — Medication 400 MILLIGRAM(S): at 06:41

## 2017-11-07 RX ADMIN — Medication 400 MILLIGRAM(S): at 23:00

## 2017-11-07 RX ADMIN — GABAPENTIN 600 MILLIGRAM(S): 400 CAPSULE ORAL at 06:41

## 2017-11-07 NOTE — CONSULT NOTE ADULT - SUBJECTIVE AND OBJECTIVE BOX
Infectious Diseases - Attending at Dr. Vail    HPI:  Pt is an 83 yo lady with a pmhx of HTN, HL, IDDM2 who presents to the ED with cough  and fever since yesterday. Has had chest pain from yesterday as well, on L. chest, nonradiating 7/10. No hx of dvt/pe, no leg swelling. Daughter says pt is less responsive. Fever at home, took tylenol yesterday. No abdominal pain, no difficulty breathing. Coughing, nonproductive. No dysuria, no hx of CAD. (2017 12:55)  At present  pt says she had fever and son in law mentions she had confusion .She denies any other symptoms at all right now. Found to be bacteremic  No UTI symptoms      PAST MEDICAL & SURGICAL HISTORY:  HTN (hypertension)  Diabetes  No significant past surgical history      Allergies    No Known Allergies    Intolerances        FAMILY HISTORY:  No pertinent family history in first degree relatives      SOCIAL HISTORY:no smoking    REVIEW OF SYSTEMS:    Constitutional: + fever, no weight loss or fatigue  Eyes: No eye pain, visual disturbances, or discharge  ENT:  No difficulty hearing, tinnitus, vertigo; No sinus or throat pain  Neck: No pain or stiffness  Respiratory: No cough, wheezing, chills or hemoptysis  Cardiovascular: No chest pain, palpitations, shortness of breath, dizziness or leg swelling  Gastrointestinal: No abdominal or epigastric pain. No nausea, vomiting or hematemesis; No diarrhea or constipation. No melena or hematochezia.  Genitourinary: No dysuria, frequency, hematuria or incontinence  Neurological:+ confusion (per family )  No headaches, memory loss, loss of strength, numbness or tremors  Skin: No itching, burning, rashes or lesions       MEDICATIONS  (STANDING):  amLODIPine   Tablet 5 milliGRAM(s) Oral daily  aspirin enteric coated 81 milliGRAM(s) Oral daily  dextrose 5%. 1000 milliLiter(s) (50 mL/Hr) IV Continuous <Continuous>  dextrose 50% Injectable 12.5 Gram(s) IV Push once  dextrose 50% Injectable 25 Gram(s) IV Push once  dextrose 50% Injectable 25 Gram(s) IV Push once  gabapentin 600 milliGRAM(s) Oral two times a day  heparin  Injectable 5000 Unit(s) SubCutaneous every 12 hours  influenza   Vaccine 0.5 milliLiter(s) IntraMuscular once  insulin lispro (HumaLOG) corrective regimen sliding scale   SubCutaneous three times a day before meals  insulin lispro (HumaLOG) corrective regimen sliding scale   SubCutaneous at bedtime  losartan 100 milliGRAM(s) Oral daily  pentoxifylline 400 milliGRAM(s) Oral three times a day  piperacillin/tazobactam IVPB. 3.375 Gram(s) IV Intermittent every 12 hours  sodium chloride 0.9%. 1000 milliLiter(s) (100 mL/Hr) IV Continuous <Continuous>    MEDICATIONS  (PRN):  acetaminophen   Tablet 650 milliGRAM(s) Oral every 6 hours PRN For Temp greater than 38 C (100.4 F)  dextrose Gel 1 Dose(s) Oral once PRN Blood Glucose LESS THAN 70 milliGRAM(s)/deciliter  glucagon  Injectable 1 milliGRAM(s) IntraMuscular once PRN Glucose LESS THAN 70 milligrams/deciliter      Vital Signs Last 24 Hrs  T(C): 36.9 (2017 11:51), Max: 38.1 (2017 00:06)  T(F): 98.4 (2017 11:51), Max: 100.5 (2017 00:06)  HR: 79 (2017 11:51) (63 - 84)  BP: 155/50 (2017 11:51) (111/36 - 181/41)  BP(mean): --  RR: 17 (2017 11:51) (17 - 26)  SpO2: 98% (2017 11:51) (85% - 100%)    PHYSICAL EXAM:    Constitutional: NAD, well-groomed, well-developed  HEENT: PERRLA, EOMI, Normal Hearing, MMM  Neck: No LAD, No JVD  Back: Normal spine flexure, No CVA tenderness  Respiratory: CTAB/L  Cardiovascular: S1 and S2, RRR, no M/G/R  Gastrointestinal: BS+, soft, NT/ND  Extremities: No peripheral edema  Vascular: 2+ peripheral pulses  Neurological: A/O x 3, no focal deficits  Skin: No rashes      LABS:    PCT 14.8                        9.6    8.3   )-----------( 172      ( 2017 10:46 )             29.4     11-07    138  |  105  |  28<H>  ----------------------------<  130<H>  3.7   |  25  |  1.69<H>    Ca    8.5      2017 10:46  Phos  2.5       Mg     2.0         TPro  7.4  /  Alb  3.3  /  TBili  0.4  /  DBili  x   /  AST  30  /  ALT  28  /  AlkPhos  49        Urinalysis Basic - ( 2017 09:21 )    Color: Yellow / Appearance: Clear / S.005 / pH: x  Gluc: x / Ketone: Negative  / Bili: Negative / Urobili: Negative mg/dL   Blood: x / Protein: 30 mg/dL / Nitrite: Negative   Leuk Esterase: Small / RBC: 0-2 /HPF / WBC 6-10   Sq Epi: x / Non Sq Epi: Few / Bacteria: Moderate        MICROBIOLOGY:  RECENT CULTURES:   .Urine Clean Catch (Midstream) XXXX XXXX   >100,000 CFU/ml Escherichia coli     .Blood Blood Blood Culture PCR   Growth in aerobic bottle: Gram Negative Rods   Growth in aerobic bottle: Gram Negative Rods  ***Blood Panel PCR results on this specimen are available  approximately 3 hours after the Gram stain result.***  Gram stain, PCR, and/or culture results may not always  correspond due to difference in methodologies.  ************************************************************  This PCR assay was performed using [employer].  The following targets are tested for: Enterococcus,  vancomycin resistant enterococci, Listeria monocytogenes,  coagulase negative staphylococci, S. aureus,  methicillin resistant S. aureus, Streptococcus agalactiae  (Group B), S. pneumoniae, S. pyogenes (Group A),  Acinetobacter baumannii, Enterobacter cloacae, E. coli,  Klebsiella oxytoca, K. pneumoniae, Proteus sp.,  Serratia marcescens, Haemophilus influenzae,  Neisseria meningitidis, Pseudomonas aeruginosa, Candida  albicans, C. glabrata, C krusei, C parapsilosis,  C. tropicalis and the KPC resistance gene.          RADIOLOGY & ADDITIONAL STUDIES:  CXR :clear lungs

## 2017-11-07 NOTE — PROVIDER CONTACT NOTE (CRITICAL VALUE NOTIFICATION) - SITUATION
Received call from Lab stating that Lab culture dated 11/6/17 preliminary growth in aerobic bottle - gram negative priya

## 2017-11-07 NOTE — CONSULT NOTE ADULT - SUBJECTIVE AND OBJECTIVE BOX
CARDIOLOGY CONSULT NOTE    Patient is a 82y Female with a known history of :  AVELINO (acute kidney injury) (N17.9)  Chronic kidney disease (N18.9)  Type 2 diabetes mellitus without complication, without long-term current use of insulin (E11.9)  Essential hypertension (I10)  Sepsis, due to unspecified organism (A41.9)    HPI:  Pt is an 81 yo lady with a pmhx of HTN, HL, IDDM2 who presents to the ED with cough, and fever since yesterday. Has had chest pain from yesterday as well, on L. chest, nonradiating 7/10. No hx of dvt/pe, no leg swelling. Daughter says pt is less responsive. Fever at home, took tylenol yesterday. No abdominal pain, no difficulty breathing. Coughing, nonproductive. No dysuria, no hx of CAD.          REVIEW OF SYSTEMS:    CONSTITUTIONAL: No fever, weight loss, or fatigue  EYES: No eye pain, visual disturbances, or discharge  ENMT:  No difficulty hearing, tinnitus, vertigo; No sinus or throat pain  NECK: No pain or stiffness  BREASTS: No pain, masses, or nipple discharge  RESPIRATORY: No cough, wheezing, chills or hemoptysis; No shortness of breath  CARDIOVASCULAR: No chest pain, palpitations, dizziness, or leg swelling  GASTROINTESTINAL: No abdominal or epigastric pain. No nausea, vomiting, or hematemesis; No diarrhea or constipation. No melena or hematochezia.  GENITOURINARY: No dysuria, frequency, hematuria, or incontinence  NEUROLOGICAL: No headaches, memory loss, loss of strength, numbness, or tremors  SKIN: No itching, burning, rashes, or lesions   LYMPH NODES: No enlarged glands  ENDOCRINE: No heat or cold intolerance; No hair loss  MUSCULOSKELETAL: No joint pain or swelling; No muscle, back, or extremity pain  PSYCHIATRIC: No depression, anxiety, mood swings, or difficulty sleeping  HEME/LYMPH: No easy bruising, or bleeding gums  ALLERGY AND IMMUNOLOGIC: No hives or eczema    MEDICATIONS  (STANDING):  amLODIPine   Tablet 5 milliGRAM(s) Oral daily  aspirin enteric coated 81 milliGRAM(s) Oral daily  dextrose 5%. 1000 milliLiter(s) (50 mL/Hr) IV Continuous <Continuous>  dextrose 50% Injectable 12.5 Gram(s) IV Push once  dextrose 50% Injectable 25 Gram(s) IV Push once  dextrose 50% Injectable 25 Gram(s) IV Push once  gabapentin 600 milliGRAM(s) Oral two times a day  heparin  Injectable 5000 Unit(s) SubCutaneous every 12 hours  influenza   Vaccine 0.5 milliLiter(s) IntraMuscular once  insulin lispro (HumaLOG) corrective regimen sliding scale   SubCutaneous three times a day before meals  insulin lispro (HumaLOG) corrective regimen sliding scale   SubCutaneous at bedtime  losartan 100 milliGRAM(s) Oral daily  pentoxifylline 400 milliGRAM(s) Oral three times a day  piperacillin/tazobactam IVPB. 3.375 Gram(s) IV Intermittent every 12 hours  sodium chloride 0.9%. 1000 milliLiter(s) (100 mL/Hr) IV Continuous <Continuous>    MEDICATIONS  (PRN):  acetaminophen   Tablet 650 milliGRAM(s) Oral every 6 hours PRN For Temp greater than 38 C (100.4 F)  dextrose Gel 1 Dose(s) Oral once PRN Blood Glucose LESS THAN 70 milliGRAM(s)/deciliter  glucagon  Injectable 1 milliGRAM(s) IntraMuscular once PRN Glucose LESS THAN 70 milligrams/deciliter      ALLERGIES: No Known Allergies      FAMILY HISTORY:  No pertinent family history in first degree relatives      PHYSICAL EXAMINATION:  -----------------------------  T(C): 36.9 (11-07-17 @ 11:51), Max: 38.1 (11-07-17 @ 00:06)  HR: 79 (11-07-17 @ 11:51) (63 - 84)  BP: 155/50 (11-07-17 @ 11:51) (111/36 - 181/41)  RR: 17 (11-07-17 @ 11:51) (17 - 26)  SpO2: 98% (11-07-17 @ 11:51) (85% - 100%)  Wt(kg): --    11-06 @ 07:01  -  11-07 @ 07:00  --------------------------------------------------------  IN:    IV PiggyBack: 100 mL    Oral Fluid: 240 mL    sodium chloride 0.9%.: 400 mL  Total IN: 740 mL    OUT:  Total OUT: 0 mL    Total NET: 740 mL            Constitutional: well developed, normal appearance, well groomed, well nourished, no deformities and no acute distress.   Eyes: the conjunctiva exhibited no abnormalities and the eyelids demonstrated no xanthelasmas.   HEENT: normal oral mucosa, no oral pallor and no oral cyanosis.   Neck: normal jugular venous A waves present, normal jugular venous V waves present and no jugular venous cooney A waves.   Pulmonary: no respiratory distress, normal respiratory rhythm and effort, no accessory muscle use and lungs were clear to auscultation bilaterally.   Cardiovascular: heart rate and rhythm were normal, normal S1 and S2 and no murmur, gallop, rub, heave or thrill are present.   Abdomen: soft, non-tender, no hepato-splenomegaly and no abdominal mass palpated.   Musculoskeletal: the gait could not be assessed..   Extremities: no clubbing of the fingernails, no localized cyanosis, no petechial hemorrhages and no ischemic changes.   Skin: normal skin color and pigmentation, no rash, no venous stasis, no skin lesions, no skin ulcer and no xanthoma was observed.   Psychiatric: oriented to person, place, and time, the affect was normal, the mood was normal and not feeling anxious.     LABS:   --------  11-07    138  |  105  |  28<H>  ----------------------------<  130<H>  3.7   |  25  |  1.69<H>    Ca    8.5      07 Nov 2017 10:46  Phos  2.5     11-06  Mg     2.0     11-06    TPro  7.4  /  Alb  3.3  /  TBili  0.4  /  DBili  x   /  AST  30  /  ALT  28  /  AlkPhos  49  11-06                         9.6    8.3   )-----------( 172      ( 07 Nov 2017 10:46 )             29.4         11-07 @ 10:46 CPK total:--, CKMB --, Troponin I - .847 ng/mL<H>  11-07 @ 01:12 CPK total:--, CKMB --, Troponin I - 1.030 ng/mL<H>  11-06 @ 22:06 CPK total:--, CKMB --, Troponin I - .739 ng/mL<H>  11-06 @ 15:54 CPK total:--, CKMB --, Troponin I - .943 ng/mL<H>  11-06 @ 08:17 CPK total:--, CKMB --, Troponin I - .113 ng/mL<H>      Culture Results:   >100,000 CFU/ml Escherichia coli (11-06 @ 13:14)  Culture Results:   Growth in aerobic bottle: Gram Negative Rods  ***Blood Panel PCR results on this specimen are available  approximately 3 hours after the Gram stain result.***  Gram stain, PCR, and/or culture results may not always  correspond due to difference in methodologies.  ************************************************************  This PCR assay was performed using [employer].  The following targets are tested for: Enterococcus,  vancomycin resistant enterococci, Listeria monocytogenes,  coagulase negative staphylococci, S. aureus,  methicillin resistant S. aureus, Streptococcus agalactiae  (Group B), S. pneumoniae, S. pyogenes (Group A),  Acinetobacter baumannii, Enterobacter cloacae, E. coli,  Klebsiella oxytoca, K. pneumoniae, Proteus sp.,  Serratia marcescens, Haemophilus influenzae,  Neisseria meningitidis, Pseudomonas aeruginosa, Candida  albicans, C. glabrata, C krusei, C parapsilosis,  C. tropicalis and the KPC resistance gene. (11-06 @ 10:35)  Culture Results:   No growth to date. (11-06 @ 10:35)      RADIOLOGY:  -----------------        ECG: CARDIOLOGY CONSULT NOTE    Patient is a 82y Female with a known history of :  AVELINO (acute kidney injury) (N17.9)  Chronic kidney disease (N18.9)  Type 2 diabetes mellitus without complication, without long-term current use of insulin (E11.9)  Essential hypertension (I10)  Sepsis, due to unspecified organism (A41.9)    HPI:  Pt is an 83 yo lady with a pmhx of HTN, HL, IDDM2 who presents to the ED with cough, and fever since yesterday. Has had chest pain from yesterday as well, on L. chest, nonradiating 7/10. No hx of dvt/pe, no leg swelling. Daughter says pt is less responsive. Fever at home, took tylenol yesterday. No abdominal pain, no difficulty breathing. Coughing, nonproductive. No dysuria, no hx of CAD.    Micro today with GNRs.    Denied chest pain currently but Mina positive as below and EKG with inferior/inferolateral ST depressions.          REVIEW OF SYSTEMS:    CONSTITUTIONAL: No fever, weight loss; + fatigue  EYES: No eye pain, visual disturbances, or discharge  ENMT:  No difficulty hearing, tinnitus, vertigo; No sinus or throat pain  NECK: No pain or stiffness  BREASTS: No pain, masses, or nipple discharge  RESPIRATORY: + cough/chills/shortness of breath  CARDIOVASCULAR: +chest pain; no palpitations, dizziness, or leg swelling  GASTROINTESTINAL: No abdominal or epigastric pain. No nausea, vomiting, or hematemesis; No diarrhea or constipation. No melena or hematochezia.  GENITOURINARY: No dysuria, frequency, hematuria, or incontinence  NEUROLOGICAL: No headaches, memory loss, loss of strength, numbness, or tremors  SKIN: No itching, burning, rashes, or lesions   LYMPH NODES: No enlarged glands  ENDOCRINE: No heat or cold intolerance; No hair loss  MUSCULOSKELETAL: No joint pain or swelling; No muscle, back, or extremity pain  PSYCHIATRIC: No depression, anxiety, mood swings, or difficulty sleeping  HEME/LYMPH: No easy bruising, or bleeding gums  ALLERGY AND IMMUNOLOGIC: No hives or eczema    MEDICATIONS  (STANDING):  amLODIPine   Tablet 5 milliGRAM(s) Oral daily  aspirin enteric coated 81 milliGRAM(s) Oral daily  dextrose 5%. 1000 milliLiter(s) (50 mL/Hr) IV Continuous <Continuous>  dextrose 50% Injectable 12.5 Gram(s) IV Push once  dextrose 50% Injectable 25 Gram(s) IV Push once  dextrose 50% Injectable 25 Gram(s) IV Push once  gabapentin 600 milliGRAM(s) Oral two times a day  heparin  Injectable 5000 Unit(s) SubCutaneous every 12 hours  influenza   Vaccine 0.5 milliLiter(s) IntraMuscular once  insulin lispro (HumaLOG) corrective regimen sliding scale   SubCutaneous three times a day before meals  insulin lispro (HumaLOG) corrective regimen sliding scale   SubCutaneous at bedtime  losartan 100 milliGRAM(s) Oral daily  pentoxifylline 400 milliGRAM(s) Oral three times a day  piperacillin/tazobactam IVPB. 3.375 Gram(s) IV Intermittent every 12 hours  sodium chloride 0.9%. 1000 milliLiter(s) (100 mL/Hr) IV Continuous <Continuous>    MEDICATIONS  (PRN):  acetaminophen   Tablet 650 milliGRAM(s) Oral every 6 hours PRN For Temp greater than 38 C (100.4 F)  dextrose Gel 1 Dose(s) Oral once PRN Blood Glucose LESS THAN 70 milliGRAM(s)/deciliter  glucagon  Injectable 1 milliGRAM(s) IntraMuscular once PRN Glucose LESS THAN 70 milligrams/deciliter      ALLERGIES: No Known Allergies      FAMILY HISTORY:  No pertinent family history in first degree relatives      PHYSICAL EXAMINATION:  -----------------------------  T(C): 36.9 (11-07-17 @ 11:51), Max: 38.1 (11-07-17 @ 00:06)  HR: 79 (11-07-17 @ 11:51) (63 - 84)  BP: 155/50 (11-07-17 @ 11:51) (111/36 - 181/41)  RR: 17 (11-07-17 @ 11:51) (17 - 26)  SpO2: 98% (11-07-17 @ 11:51) (85% - 100%)  Wt(kg): --    11-06 @ 07:01  -  11-07 @ 07:00  --------------------------------------------------------  IN:    IV PiggyBack: 100 mL    Oral Fluid: 240 mL    sodium chloride 0.9%.: 400 mL  Total IN: 740 mL    OUT:  Total OUT: 0 mL    Total NET: 740 mL            Constitutional: well developed, normal appearance, well groomed, well nourished, no deformities and no acute distress.   Eyes: the conjunctiva exhibited no abnormalities and the eyelids demonstrated no xanthelasmas.   HEENT: normal oral mucosa, no oral pallor and no oral cyanosis.   Neck: normal jugular venous A waves present, normal jugular venous V waves present and no jugular venous cooney A waves.   Pulmonary: no respiratory distress, normal respiratory rhythm and effort, no accessory muscle use and lungs were clear to auscultation bilaterally.   Cardiovascular: heart rate and rhythm were normal, normal S1 and S2 and no murmur, gallop, rub, heave or thrill are present.   Abdomen: soft, non-tender, no hepato-splenomegaly and no abdominal mass palpated.   Musculoskeletal: the gait could not be assessed..   Extremities: no clubbing of the fingernails, no localized cyanosis, no petechial hemorrhages and no ischemic changes.   Skin: normal skin color and pigmentation, no rash, no venous stasis, no skin lesions, no skin ulcer and no xanthoma was observed.   Psychiatric: oriented to person, place, and time, the affect was normal, the mood was normal and not feeling anxious.     LABS:   --------  11-07    138  |  105  |  28<H>  ----------------------------<  130<H>  3.7   |  25  |  1.69<H>    Ca    8.5      07 Nov 2017 10:46  Phos  2.5     11-06  Mg     2.0     11-06    TPro  7.4  /  Alb  3.3  /  TBili  0.4  /  DBili  x   /  AST  30  /  ALT  28  /  AlkPhos  49  11-06                         9.6    8.3   )-----------( 172      ( 07 Nov 2017 10:46 )             29.4         11-07 @ 10:46 CPK total:--, CKMB --, Troponin I - .847 ng/mL<H>  11-07 @ 01:12 CPK total:--, CKMB --, Troponin I - 1.030 ng/mL<H>  11-06 @ 22:06 CPK total:--, CKMB --, Troponin I - .739 ng/mL<H>  11-06 @ 15:54 CPK total:--, CKMB --, Troponin I - .943 ng/mL<H>  11-06 @ 08:17 CPK total:--, CKMB --, Troponin I - .113 ng/mL<H>      Culture Results:   >100,000 CFU/ml Escherichia coli (11-06 @ 13:14)  Culture Results:   Growth in aerobic bottle: Gram Negative Rods  ***Blood Panel PCR results on this specimen are available  approximately 3 hours after the Gram stain result.***  Gram stain, PCR, and/or culture results may not always  correspond due to difference in methodologies.  ************************************************************  This PCR assay was performed using [employer].  The following targets are tested for: Enterococcus,  vancomycin resistant enterococci, Listeria monocytogenes,  coagulase negative staphylococci, S. aureus,  methicillin resistant S. aureus, Streptococcus agalactiae  (Group B), S. pneumoniae, S. pyogenes (Group A),  Acinetobacter baumannii, Enterobacter cloacae, E. coli,  Klebsiella oxytoca, K. pneumoniae, Proteus sp.,  Serratia marcescens, Haemophilus influenzae,  Neisseria meningitidis, Pseudomonas aeruginosa, Candida  albicans, C. glabrata, C krusei, C parapsilosis,  C. tropicalis and the KPC resistance gene. (11-06 @ 10:35)  Culture Results:   No growth to date. (11-06 @ 10:35)      RADIOLOGY:  -----------------    ECG: NSR 89bpm; inferior/inferolateral ST depressions

## 2017-11-07 NOTE — CONSULT NOTE ADULT - ASSESSMENT
82 yr old female seen with
81 yo lady with a pmhx of HTN, HL, IDDM2 who presents to the ED with cough and fever since yesterday, and chest pain. Daughter says pt is less responsive.  Denied dyspnea; no hx of CAD.  Micro today with GNRs; being treated for sepsis.    Mina positive to 1 and now trending down; EKG with inferior/inferolateral ST depressions.    -trend Mina  -repeat EKG  -2D echo  -cont supportive care for sepsis with IVFs and abx   -ischemic eval when stable and off abx; stress vs cath  -cont asa  -start metoprolol 25mg BID

## 2017-11-07 NOTE — PROGRESS NOTE ADULT - SUBJECTIVE AND OBJECTIVE BOX
Patient is a 82y old  Female who presents with a chief complaint of fever (2017 12:55)      INTERVAL HPI/OVERNIGHT EVENTS: Pt was seen and examined at bedside, no significant overnight events, pt admits to fevers last night w/ chills and weakness. She admits to feeling better today, no coughing, nausea/vomiting, diarrhea and dysuria.    MEDICATIONS  (STANDING):  amLODIPine   Tablet 5 milliGRAM(s) Oral daily  aspirin enteric coated 81 milliGRAM(s) Oral daily  dextrose 5%. 1000 milliLiter(s) (50 mL/Hr) IV Continuous <Continuous>  dextrose 50% Injectable 12.5 Gram(s) IV Push once  dextrose 50% Injectable 25 Gram(s) IV Push once  dextrose 50% Injectable 25 Gram(s) IV Push once  gabapentin 600 milliGRAM(s) Oral two times a day  heparin  Injectable 5000 Unit(s) SubCutaneous every 12 hours  influenza   Vaccine 0.5 milliLiter(s) IntraMuscular once  insulin lispro (HumaLOG) corrective regimen sliding scale   SubCutaneous three times a day before meals  insulin lispro (HumaLOG) corrective regimen sliding scale   SubCutaneous at bedtime  losartan 100 milliGRAM(s) Oral daily  metoprolol     tartrate 25 milliGRAM(s) Oral two times a day  pentoxifylline 400 milliGRAM(s) Oral three times a day  piperacillin/tazobactam IVPB. 3.375 Gram(s) IV Intermittent every 12 hours  sodium chloride 0.9%. 1000 milliLiter(s) (100 mL/Hr) IV Continuous <Continuous>  vancomycin  IVPB        MEDICATIONS  (PRN):  acetaminophen   Tablet 650 milliGRAM(s) Oral every 6 hours PRN For Temp greater than 38 C (100.4 F)  dextrose Gel 1 Dose(s) Oral once PRN Blood Glucose LESS THAN 70 milliGRAM(s)/deciliter  glucagon  Injectable 1 milliGRAM(s) IntraMuscular once PRN Glucose LESS THAN 70 milligrams/deciliter      Allergies    No Known Allergies    Intolerances        REVIEW OF SYSTEMS:  CONSTITUTIONAL: positive fever, generalized weakness, and Fatigue  EYES: No eye pain, visual disturbances, or discharge  ENMT:  No difficulty hearing, tinnitus, vertigo; No sinus or throat pain  NECK: No pain or stiffness  RESPIRATORY: No shortness of breath,  cough, wheezing, chills or hemoptysis;   CARDIOVASCULAR: No chest pain, palpitations, or leg swelling  GASTROINTESTINAL: No abdominal pain. No nausea, vomiting, diarrhea or constipation. No melena or hematochezia.  GENITOURINARY: No dysuria, frequency, hematuria, or incontinence  NEUROLOGICAL: No headaches, Dizziness, memory loss, loss of strength, numbness, or tremors  SKIN: No itching, burning, rashes, or lesions   MUSCULOSKELETAL: No joint pain or swelling; No muscle, back, or extremity pain  PSYCHIATRIC: No depression, anxiety, mood swings, or difficulty sleeping  HEME/LYMPH: No easy bruising, or bleeding gums  ALLERGY AND IMMUNOLOGIC: No hives or eczema    Vital Signs Last 24 Hrs  T(C): 38.1 (2017 18:16), Max: 38.4 (2017 16:19)  T(F): 100.5 (2017 18:16), Max: 101.1 (2017 16:19)  HR: 77 (2017 15:55) (64 - 84)  BP: 144/45 (2017 15:55) (111/36 - 181/41)  BP(mean): --  RR: 17 (2017 15:55) (17 - 26)  SpO2: 100% (2017 15:55) (85% - 100%)    PHYSICAL EXAM:    GENERAL: NAD, well-groomed, well-developed  HEAD:  Atraumatic, Normocephalic  EYES: EOMI, PERRLA, conjunctiva and sclera clear  ENMT: Moist mucous membranes  NECK: Supple, No JVD, Normal thyroid  CHEST/LUNG: Clear to percussion bilaterally; No rales, rhonchi, wheezing, or rubs  HEART: Regular rate and rhythm; No murmurs, rubs, or gallops  ABDOMEN: Soft, Nontender, Nondistended; Bowel sounds present  EXTREMITIES:  2+ Peripheral Pulses, No clubbing, cyanosis, or edema  LYMPH: No lymphadenopathy noted  SKIN: No rashes or lesions  NERVOUS SYSTEM:  Alert & Oriented X3, Good concentration; Motor Strength 4/5 throughout    LABS:                        9.6    8.3   )-----------( 172      ( 2017 10:46 )             29.4     11-07    138  |  105  |  28<H>  ----------------------------<  130<H>  3.7   |  25  |  1.69<H>    Ca    8.5      2017 10:46  Phos  2.5       Mg     2.0         TPro  7.4  /  Alb  3.3  /  TBili  0.4  /  DBili  x   /  AST  30  /  ALT  28  /  AlkPhos  49        Urinalysis Basic - ( 2017 09:21 )    Color: Yellow / Appearance: Clear / S.005 / pH: x  Gluc: x / Ketone: Negative  / Bili: Negative / Urobili: Negative mg/dL   Blood: x / Protein: 30 mg/dL / Nitrite: Negative   Leuk Esterase: Small / RBC: 0-2 /HPF / WBC 6-10   Sq Epi: x / Non Sq Epi: Few / Bacteria: Moderate      CAPILLARY BLOOD GLUCOSE  133 (2017 21:47)      POCT Blood Glucose.: 169 mg/dL (2017 16:45)  POCT Blood Glucose.: 167 mg/dL (2017 11:25)  POCT Blood Glucose.: 110 mg/dL (2017 08:20)  POCT Blood Glucose.: 133 mg/dL (2017 23:20)  POCT Blood Glucose.: 113 mg/dL (2017 20:49)        Culture - Urine (collected 17)  Source: .Urine Clean Catch (Midstream)  Preliminary Report (17):    >100,000 CFU/ml Escherichia coli    Culture - Blood (collected 17)  Source: .Blood Blood  Preliminary Report (17):    No growth to date.    Culture - Blood (collected 17)  Source: .Blood Blood  Gram Stain (prelim) (17):    Growth in aerobic bottle: Gram Negative Rods  Preliminary Report (17):    Growth in aerobic bottle: Escherichia coli    ***Blood Panel PCR results on this specimen are available    approximately 3 hours after the Gram stain result.***    Gram stain, PCR, and/or culture results may not always    correspond due to difference in methodologies.    ************************************************************    This PCR assay was performed using [employer].    The following targets are tested for: Enterococcus,    vancomycin resistant enterococci, Listeria monocytogenes,    coagulase negative staphylococci, S. aureus,    methicillin resistant S. aureus, Streptococcus agalactiae    (Group B), S. pneumoniae, S. pyogenes (Group A),    Acinetobacter baumannii, Enterobacter cloacae, E. coli,    Klebsiella oxytoca, K. pneumoniae, Proteus sp.,    Serratia marcescens, Haemophilus influenzae,    Neisseria meningitidis, Pseudomonas aeruginosa, Candida    albicans, C. glabrata, C krusei, C parapsilosis,    C. tropicalis and the KPC resistance gene.  Organism: Blood Culture PCR (17)  Organism: Blood Culture PCR (17)    Sensitivities:      -  Escherichia coli: Detec      Method Type: PCR        RADIOLOGY & ADDITIONAL TESTS:          Imaging Personally Reviewed:  [x ] YES  [ ] NO    Consultant(s) Notes Reviewed:  [x ] YES  [ ] NO    Care Discussed with Consultants/Other Providers [ x] YES  [ ] NO

## 2017-11-08 LAB
-  AMIKACIN: SIGNIFICANT CHANGE UP
-  AMIKACIN: SIGNIFICANT CHANGE UP
-  AMPICILLIN/SULBACTAM: SIGNIFICANT CHANGE UP
-  AMPICILLIN/SULBACTAM: SIGNIFICANT CHANGE UP
-  AMPICILLIN: SIGNIFICANT CHANGE UP
-  AMPICILLIN: SIGNIFICANT CHANGE UP
-  AZTREONAM: SIGNIFICANT CHANGE UP
-  AZTREONAM: SIGNIFICANT CHANGE UP
-  CEFAZOLIN: SIGNIFICANT CHANGE UP
-  CEFAZOLIN: SIGNIFICANT CHANGE UP
-  CEFEPIME: SIGNIFICANT CHANGE UP
-  CEFEPIME: SIGNIFICANT CHANGE UP
-  CEFOXITIN: SIGNIFICANT CHANGE UP
-  CEFOXITIN: SIGNIFICANT CHANGE UP
-  CEFTAZIDIME: SIGNIFICANT CHANGE UP
-  CEFTAZIDIME: SIGNIFICANT CHANGE UP
-  CEFTRIAXONE: SIGNIFICANT CHANGE UP
-  CEFTRIAXONE: SIGNIFICANT CHANGE UP
-  CIPROFLOXACIN: SIGNIFICANT CHANGE UP
-  CIPROFLOXACIN: SIGNIFICANT CHANGE UP
-  ERTAPENEM: SIGNIFICANT CHANGE UP
-  ERTAPENEM: SIGNIFICANT CHANGE UP
-  GENTAMICIN: SIGNIFICANT CHANGE UP
-  GENTAMICIN: SIGNIFICANT CHANGE UP
-  IMIPENEM: SIGNIFICANT CHANGE UP
-  IMIPENEM: SIGNIFICANT CHANGE UP
-  LEVOFLOXACIN: SIGNIFICANT CHANGE UP
-  LEVOFLOXACIN: SIGNIFICANT CHANGE UP
-  MEROPENEM: SIGNIFICANT CHANGE UP
-  MEROPENEM: SIGNIFICANT CHANGE UP
-  NITROFURANTOIN: SIGNIFICANT CHANGE UP
-  PIPERACILLIN/TAZOBACTAM: SIGNIFICANT CHANGE UP
-  PIPERACILLIN/TAZOBACTAM: SIGNIFICANT CHANGE UP
-  TOBRAMYCIN: SIGNIFICANT CHANGE UP
-  TOBRAMYCIN: SIGNIFICANT CHANGE UP
-  TRIMETHOPRIM/SULFAMETHOXAZOLE: SIGNIFICANT CHANGE UP
-  TRIMETHOPRIM/SULFAMETHOXAZOLE: SIGNIFICANT CHANGE UP
ANION GAP SERPL CALC-SCNC: 13 MMOL/L — SIGNIFICANT CHANGE UP (ref 5–17)
BUN SERPL-MCNC: 18 MG/DL — SIGNIFICANT CHANGE UP (ref 7–23)
CALCIUM SERPL-MCNC: 8.4 MG/DL — LOW (ref 8.5–10.1)
CHLORIDE SERPL-SCNC: 104 MMOL/L — SIGNIFICANT CHANGE UP (ref 96–108)
CHOLEST SERPL-MCNC: 185 MG/DL — SIGNIFICANT CHANGE UP (ref 10–199)
CO2 SERPL-SCNC: 20 MMOL/L — LOW (ref 22–31)
CREAT SERPL-MCNC: 1.45 MG/DL — HIGH (ref 0.5–1.3)
CULTURE RESULTS: SIGNIFICANT CHANGE UP
CULTURE RESULTS: SIGNIFICANT CHANGE UP
GLUCOSE BLDC GLUCOMTR-MCNC: 150 MG/DL — HIGH (ref 70–99)
GLUCOSE BLDC GLUCOMTR-MCNC: 172 MG/DL — HIGH (ref 70–99)
GLUCOSE BLDC GLUCOMTR-MCNC: 190 MG/DL — HIGH (ref 70–99)
GLUCOSE BLDC GLUCOMTR-MCNC: 212 MG/DL — HIGH (ref 70–99)
GLUCOSE SERPL-MCNC: 177 MG/DL — HIGH (ref 70–99)
GRAM STN FLD: SIGNIFICANT CHANGE UP
HCT VFR BLD CALC: 32.4 % — LOW (ref 34.5–45)
HDLC SERPL-MCNC: 41 MG/DL — SIGNIFICANT CHANGE UP (ref 40–125)
HGB BLD-MCNC: 10.4 G/DL — LOW (ref 11.5–15.5)
LACTATE SERPL-SCNC: 1.2 MMOL/L — SIGNIFICANT CHANGE UP (ref 0.7–2)
LEGIONELLA AG UR QL: NEGATIVE — SIGNIFICANT CHANGE UP
LIPID PNL WITH DIRECT LDL SERPL: 116 MG/DL — SIGNIFICANT CHANGE UP
MCHC RBC-ENTMCNC: 30 PG — SIGNIFICANT CHANGE UP (ref 27–34)
MCHC RBC-ENTMCNC: 32.2 GM/DL — SIGNIFICANT CHANGE UP (ref 32–36)
MCV RBC AUTO: 93.4 FL — SIGNIFICANT CHANGE UP (ref 80–100)
METHOD TYPE: SIGNIFICANT CHANGE UP
METHOD TYPE: SIGNIFICANT CHANGE UP
ORGANISM # SPEC MICROSCOPIC CNT: SIGNIFICANT CHANGE UP
PLATELET # BLD AUTO: 169 K/UL — SIGNIFICANT CHANGE UP (ref 150–400)
POTASSIUM SERPL-MCNC: 3.7 MMOL/L — SIGNIFICANT CHANGE UP (ref 3.5–5.3)
POTASSIUM SERPL-SCNC: 3.7 MMOL/L — SIGNIFICANT CHANGE UP (ref 3.5–5.3)
RBC # BLD: 3.46 M/UL — LOW (ref 3.8–5.2)
RBC # FLD: 14.7 % — SIGNIFICANT CHANGE UP (ref 11–15)
SODIUM SERPL-SCNC: 137 MMOL/L — SIGNIFICANT CHANGE UP (ref 135–145)
SPECIMEN SOURCE: SIGNIFICANT CHANGE UP
SPECIMEN SOURCE: SIGNIFICANT CHANGE UP
TOTAL CHOLESTEROL/HDL RATIO MEASUREMENT: 4.5 RATIO — SIGNIFICANT CHANGE UP (ref 3.3–7.1)
TRIGL SERPL-MCNC: 139 MG/DL — SIGNIFICANT CHANGE UP (ref 10–149)
TROPONIN I SERPL-MCNC: 0.48 NG/ML — HIGH (ref 0.01–0.04)
WBC # BLD: 7.9 K/UL — SIGNIFICANT CHANGE UP (ref 3.8–10.5)
WBC # FLD AUTO: 7.9 K/UL — SIGNIFICANT CHANGE UP (ref 3.8–10.5)

## 2017-11-08 PROCEDURE — 99232 SBSQ HOSP IP/OBS MODERATE 35: CPT

## 2017-11-08 PROCEDURE — 93010 ELECTROCARDIOGRAM REPORT: CPT

## 2017-11-08 PROCEDURE — 99233 SBSQ HOSP IP/OBS HIGH 50: CPT

## 2017-11-08 RX ORDER — SIMVASTATIN 20 MG/1
20 TABLET, FILM COATED ORAL AT BEDTIME
Qty: 0 | Refills: 0 | Status: DISCONTINUED | OUTPATIENT
Start: 2017-11-08 | End: 2017-11-10

## 2017-11-08 RX ORDER — CEFTRIAXONE 500 MG/1
1 INJECTION, POWDER, FOR SOLUTION INTRAMUSCULAR; INTRAVENOUS EVERY 24 HOURS
Qty: 0 | Refills: 0 | Status: DISCONTINUED | OUTPATIENT
Start: 2017-11-08 | End: 2017-11-09

## 2017-11-08 RX ADMIN — GABAPENTIN 600 MILLIGRAM(S): 400 CAPSULE ORAL at 17:07

## 2017-11-08 RX ADMIN — Medication 25 MILLIGRAM(S): at 17:11

## 2017-11-08 RX ADMIN — Medication 2: at 08:36

## 2017-11-08 RX ADMIN — LOSARTAN POTASSIUM 100 MILLIGRAM(S): 100 TABLET, FILM COATED ORAL at 06:12

## 2017-11-08 RX ADMIN — CEFTRIAXONE 100 GRAM(S): 500 INJECTION, POWDER, FOR SOLUTION INTRAMUSCULAR; INTRAVENOUS at 12:58

## 2017-11-08 RX ADMIN — HEPARIN SODIUM 5000 UNIT(S): 5000 INJECTION INTRAVENOUS; SUBCUTANEOUS at 06:12

## 2017-11-08 RX ADMIN — SODIUM CHLORIDE 100 MILLILITER(S): 9 INJECTION INTRAMUSCULAR; INTRAVENOUS; SUBCUTANEOUS at 06:35

## 2017-11-08 RX ADMIN — Medication 2: at 17:07

## 2017-11-08 RX ADMIN — PIPERACILLIN AND TAZOBACTAM 25 GRAM(S): 4; .5 INJECTION, POWDER, LYOPHILIZED, FOR SOLUTION INTRAVENOUS at 06:12

## 2017-11-08 RX ADMIN — SIMVASTATIN 20 MILLIGRAM(S): 20 TABLET, FILM COATED ORAL at 23:00

## 2017-11-08 RX ADMIN — Medication 400 MILLIGRAM(S): at 15:01

## 2017-11-08 RX ADMIN — Medication 400 MILLIGRAM(S): at 23:00

## 2017-11-08 RX ADMIN — Medication 81 MILLIGRAM(S): at 12:37

## 2017-11-08 RX ADMIN — GABAPENTIN 600 MILLIGRAM(S): 400 CAPSULE ORAL at 06:12

## 2017-11-08 RX ADMIN — Medication 25 MILLIGRAM(S): at 06:15

## 2017-11-08 RX ADMIN — HEPARIN SODIUM 5000 UNIT(S): 5000 INJECTION INTRAVENOUS; SUBCUTANEOUS at 17:07

## 2017-11-08 RX ADMIN — Medication 400 MILLIGRAM(S): at 06:12

## 2017-11-08 RX ADMIN — AMLODIPINE BESYLATE 5 MILLIGRAM(S): 2.5 TABLET ORAL at 06:12

## 2017-11-08 NOTE — PROGRESS NOTE ADULT - SUBJECTIVE AND OBJECTIVE BOX
Patient is a 82y old  Female who presents with a chief complaint of fever (06 Nov 2017 12:55)  Pt is an 81 yo lady with a pmhx of HTN, HL, IDDM2 who presents to the ED with cough, and fever since yesterday. Has had chest pain from yesterday as well, on L. chest, nonradiating 7/10. No hx of dvt/pe, no leg swelling. Daughter says pt is less responsive. Fever at home, took tylenol yesterday. No abdominal pain, no difficulty breathing. Coughing, nonproductive. No dysuria, no hx of CAD.    Micro today with GNRs.    Denied chest pain currently but Mina positive as below and EKG with inferior/inferolateral ST depressions.    PAST MEDICAL & SURGICAL HISTORY:  HTN (hypertension)  Diabetes  No significant past surgical history    Home Medications:  amLODIPine 5 mg oral tablet: 1 tab(s) orally once a day (06 Nov 2017 08:24)  aspirin 81 mg oral tablet: 1 tab(s) orally once a day (06 Nov 2017 08:24)  gabapentin 600 mg oral tablet: 1 tab(s) orally 3 times a day (06 Nov 2017 08:24)  glipiZIDE 5 mg oral tablet: 1 tab(s) orally once a day (06 Nov 2017 08:24)  hydroCHLOROthiazide 12.5 mg oral tablet: 1 tab(s) orally once a day (06 Nov 2017 08:24)  losartan 100 mg oral tablet: 1 tab(s) orally once a day (06 Nov 2017 08:24)  pentoxifylline 400 mg oral tablet, extended release: 1 tab(s) orally 3 times a day (06 Nov 2017 08:24)  pioglitazone 30 mg oral tablet: 1 tab(s) orally once a day (06 Nov 2017 08:24)  Tradjenta 5 mg oral tablet: 1 tab(s) orally once a day (06 Nov 2017 08:24)  Zetia 10 mg oral tablet: 1 tab(s) orally once a day (06 Nov 2017 08:24)      INTERVAL HISTORY: Still feels weak but making improvement Denies CP/SOB. Urinating frequently.   	  MEDICATIONS:  MEDICATIONS  (STANDING):  amLODIPine   Tablet 5 milliGRAM(s) Oral daily  aspirin enteric coated 81 milliGRAM(s) Oral daily  dextrose 5%. 1000 milliLiter(s) (50 mL/Hr) IV Continuous <Continuous>  dextrose 50% Injectable 12.5 Gram(s) IV Push once  dextrose 50% Injectable 25 Gram(s) IV Push once  dextrose 50% Injectable 25 Gram(s) IV Push once  gabapentin 600 milliGRAM(s) Oral two times a day  heparin  Injectable 5000 Unit(s) SubCutaneous every 12 hours  influenza   Vaccine 0.5 milliLiter(s) IntraMuscular once  insulin lispro (HumaLOG) corrective regimen sliding scale   SubCutaneous three times a day before meals  insulin lispro (HumaLOG) corrective regimen sliding scale   SubCutaneous at bedtime  losartan 100 milliGRAM(s) Oral daily  metoprolol     tartrate 25 milliGRAM(s) Oral two times a day  pentoxifylline 400 milliGRAM(s) Oral three times a day  piperacillin/tazobactam IVPB. 3.375 Gram(s) IV Intermittent every 12 hours  sodium chloride 0.9%. 1000 milliLiter(s) (100 mL/Hr) IV Continuous <Continuous>  vancomycin  IVPB        MEDICATIONS  (PRN):  acetaminophen   Tablet 650 milliGRAM(s) Oral every 6 hours PRN For Temp greater than 38 C (100.4 F)  dextrose Gel 1 Dose(s) Oral once PRN Blood Glucose LESS THAN 70 milliGRAM(s)/deciliter  glucagon  Injectable 1 milliGRAM(s) IntraMuscular once PRN Glucose LESS THAN 70 milligrams/deciliter      Vitals:  T(F): 98.8 (11-08-17 @ 04:50), Max: 101.1 (11-07-17 @ 16:19)  HR: 86 (11-08-17 @ 04:50) (74 - 86)  BP: 156/40 (11-08-17 @ 04:50) (144/45 - 163/54)  RR: 17 (11-08-17 @ 04:50) (17 - 17)  SpO2: 100% (11-08-17 @ 04:50) (98% - 100%)  Wt(kg): --    11-07 @ 07:01  -  11-08 @ 07:00  --------------------------------------------------------  IN:    IV PiggyBack: 350 mL    Oral Fluid: 360 mL    sodium chloride 0.9%.: 2000 mL  Total IN: 2710 mL    OUT:    Voided: 700 mL  Total OUT: 700 mL    Total NET: 2010 mL    PHYSICAL EXAM:  Neuro: Awake, responsive  CV: S1 S2 RRR  Lungs: CTABL  GI: Soft, BS +, ND, NT  Extremities: No edema    TELEMETRY: NSR, occ PVC overnight  	      [ ] Echocardiogram: ordered for today  	    LABS:	 	    CARDIAC MARKERS:  Troponin I, Serum: .485 ng/mL (11-08 @ 07:20)  Troponin I, Serum: .847 ng/mL (11-07 @ 10:46)  Troponin I, Serum: 1.030 ng/mL (11-07 @ 01:12)  Troponin I, Serum: .739 ng/mL (11-06 @ 22:06)  Troponin I, Serum: .943 ng/mL (11-06 @ 15:54)                     10.4   7.9   )-----------( 169      ( 08 Nov 2017 07:20 )             32.4 ,                       9.6    8.3   )-----------( 172      ( 07 Nov 2017 10:46 )             29.4 ,                       9.3    8.6   )-----------( 146      ( 06 Nov 2017 22:06 )             28.5 ,                       9.8    8.9   )-----------( 161      ( 06 Nov 2017 08:17 )             30.2   11-08    137  |  104  |  18  ----------------------------<  177<H>  3.7   |  20<L>  |  1.45<H>    Ca    8.4<L>      08 Nov 2017 07:20  Phos  2.5     11-06  Mg     2.0     11-06    TPro  7.4  /  Alb  3.3  /  TBili  0.4  /  DBili  x   /  AST  30  /  ALT  28  /  AlkPhos  49  11-06    HgA1c: Hemoglobin A1C, Whole Blood: 6.5 % (11-07 @ 15:30)    Culture - Urine (collected 06 Nov 2017 13:14)  Source: .Urine Clean Catch (Midstream)  Final Report (08 Nov 2017 08:27):    >100,000 CFU/ml Escherichia coli  Organism: Escherichia coli (08 Nov 2017 08:27)  Organism: Escherichia coli (08 Nov 2017 08:27)    Culture - Blood (collected 06 Nov 2017 10:35)  Source: .Blood Blood  Preliminary Report (07 Nov 2017 11:01):    No growth to date.    Culture - Blood (collected 06 Nov 2017 10:35)  Source: .Blood Blood  Gram Stain (prelim) (07 Nov 2017 02:06):    Growth in aerobic bottle: Gram Negative Rods  Preliminary Report (07 Nov 2017 17:36):    Growth in aerobic bottle: Escherichia coli    ***Blood Panel PCR results on this specimen are available    approximately 3 hours after the Gram stain result.***    Gram stain, PCR, and/or culture results may not always    correspond due to difference in methodologies.    ************************************************************    This PCR assay was performed using [employer].    The following targets are tested for: Enterococcus,    vancomycin resistant enterococci, Listeria monocytogenes,    coagulase negative staphylococci, S. aureus,    methicillin resistant S. aureus, Streptococcus agalactiae    (Group B), S. pneumoniae, S. pyogenes (Group A),    Acinetobacter baumannii, Enterobacter cloacae, E. coli,    Klebsiella oxytoca, K. pneumoniae, Proteus sp.,    Serratia marcescens, Haemophilus influenzae,    Neisseria meningitidis, Pseudomonas aeruginosa, Candida    albicans, C. glabrata, C krusei, C parapsilosis,    C. tropicalis and the KPC resistance gene.  Organism: Blood Culture PCR (07 Nov 2017 03:56)  Organism: Blood Culture PCR (07 Nov 2017 03:56) Patient is a 82y old  Female who presents with a chief complaint of fever (06 Nov 2017 12:55)  Pt is an 81 yo lady with a pmhx of HTN, HL, IDDM2 who presents to the ED with cough, and fever since yesterday. Has had chest pain from yesterday as well, on L. chest, nonradiating 7/10. No hx of dvt/pe, no leg swelling. Daughter says pt is less responsive. Fever at home, took tylenol yesterday. No abdominal pain, no difficulty breathing. Coughing, nonproductive. No dysuria, no hx of CAD.    Micro today with GNRs.    Denied chest pain currently but Mina positive as below and EKG with inferior/inferolateral ST depressions.    PAST MEDICAL & SURGICAL HISTORY:  HTN (hypertension)  Diabetes  No significant past surgical history    Home Medications:  amLODIPine 5 mg oral tablet: 1 tab(s) orally once a day (06 Nov 2017 08:24)  aspirin 81 mg oral tablet: 1 tab(s) orally once a day (06 Nov 2017 08:24)  gabapentin 600 mg oral tablet: 1 tab(s) orally 3 times a day (06 Nov 2017 08:24)  glipiZIDE 5 mg oral tablet: 1 tab(s) orally once a day (06 Nov 2017 08:24)  hydroCHLOROthiazide 12.5 mg oral tablet: 1 tab(s) orally once a day (06 Nov 2017 08:24)  losartan 100 mg oral tablet: 1 tab(s) orally once a day (06 Nov 2017 08:24)  pentoxifylline 400 mg oral tablet, extended release: 1 tab(s) orally 3 times a day (06 Nov 2017 08:24)  pioglitazone 30 mg oral tablet: 1 tab(s) orally once a day (06 Nov 2017 08:24)  Tradjenta 5 mg oral tablet: 1 tab(s) orally once a day (06 Nov 2017 08:24)  Zetia 10 mg oral tablet: 1 tab(s) orally once a day (06 Nov 2017 08:24)      INTERVAL HISTORY: Still feels weak but making improvement Denies CP/SOB. Urinating frequently.   	  MEDICATIONS:  MEDICATIONS  (STANDING):  amLODIPine   Tablet 5 milliGRAM(s) Oral daily  aspirin enteric coated 81 milliGRAM(s) Oral daily  dextrose 5%. 1000 milliLiter(s) (50 mL/Hr) IV Continuous <Continuous>  dextrose 50% Injectable 12.5 Gram(s) IV Push once  dextrose 50% Injectable 25 Gram(s) IV Push once  dextrose 50% Injectable 25 Gram(s) IV Push once  gabapentin 600 milliGRAM(s) Oral two times a day  heparin  Injectable 5000 Unit(s) SubCutaneous every 12 hours  influenza   Vaccine 0.5 milliLiter(s) IntraMuscular once  insulin lispro (HumaLOG) corrective regimen sliding scale   SubCutaneous three times a day before meals  insulin lispro (HumaLOG) corrective regimen sliding scale   SubCutaneous at bedtime  losartan 100 milliGRAM(s) Oral daily  metoprolol     tartrate 25 milliGRAM(s) Oral two times a day  pentoxifylline 400 milliGRAM(s) Oral three times a day  piperacillin/tazobactam IVPB. 3.375 Gram(s) IV Intermittent every 12 hours  sodium chloride 0.9%. 1000 milliLiter(s) (100 mL/Hr) IV Continuous <Continuous>  vancomycin  IVPB        MEDICATIONS  (PRN):  acetaminophen   Tablet 650 milliGRAM(s) Oral every 6 hours PRN For Temp greater than 38 C (100.4 F)  dextrose Gel 1 Dose(s) Oral once PRN Blood Glucose LESS THAN 70 milliGRAM(s)/deciliter  glucagon  Injectable 1 milliGRAM(s) IntraMuscular once PRN Glucose LESS THAN 70 milligrams/deciliter      Vitals:  T(F): 98.8 (11-08-17 @ 04:50), Max: 101.1 (11-07-17 @ 16:19)  HR: 86 (11-08-17 @ 04:50) (74 - 86)  BP: 156/40 (11-08-17 @ 04:50) (144/45 - 163/54)  RR: 17 (11-08-17 @ 04:50) (17 - 17)  SpO2: 100% (11-08-17 @ 04:50) (98% - 100%)  Wt(kg): --    11-07 @ 07:01  -  11-08 @ 07:00  --------------------------------------------------------  IN:    IV PiggyBack: 350 mL    Oral Fluid: 360 mL    sodium chloride 0.9%.: 2000 mL  Total IN: 2710 mL    OUT:    Voided: 700 mL  Total OUT: 700 mL    Total NET: 2010 mL    PHYSICAL EXAM:  Neuro: Awake, responsive, non-focal, answers questions appropriately  CV: S1 S2 RRR, NSR on Tele, soft distant murmur.   Lungs: CTABL  GI: Soft, BS +, ND, NT  Extremities: Trace LE bilat edema    TELEMETRY: NSR, occ PVC overnight  	      [ ] Echocardiogram: ordered for today  	    LABS:	 	    CARDIAC MARKERS:  Troponin I, Serum: .485 ng/mL (11-08 @ 07:20)  Troponin I, Serum: .847 ng/mL (11-07 @ 10:46)  Troponin I, Serum: 1.030 ng/mL (11-07 @ 01:12)  Troponin I, Serum: .739 ng/mL (11-06 @ 22:06)  Troponin I, Serum: .943 ng/mL (11-06 @ 15:54)                     10.4   7.9   )-----------( 169      ( 08 Nov 2017 07:20 )             32.4 ,                       9.6    8.3   )-----------( 172      ( 07 Nov 2017 10:46 )             29.4 ,                       9.3    8.6   )-----------( 146      ( 06 Nov 2017 22:06 )             28.5 ,                       9.8    8.9   )-----------( 161      ( 06 Nov 2017 08:17 )             30.2   11-08    137  |  104  |  18  ----------------------------<  177<H>  3.7   |  20<L>  |  1.45<H>    Ca    8.4<L>      08 Nov 2017 07:20  Phos  2.5     11-06  Mg     2.0     11-06    TPro  7.4  /  Alb  3.3  /  TBili  0.4  /  DBili  x   /  AST  30  /  ALT  28  /  AlkPhos  49  11-06    HgA1c: Hemoglobin A1C, Whole Blood: 6.5 % (11-07 @ 15:30)    Culture - Urine (collected 06 Nov 2017 13:14)  Source: .Urine Clean Catch (Midstream)  Final Report (08 Nov 2017 08:27):    >100,000 CFU/ml Escherichia coli  Organism: Escherichia coli (08 Nov 2017 08:27)  Organism: Escherichia coli (08 Nov 2017 08:27)    Culture - Blood (collected 06 Nov 2017 10:35)  Source: .Blood Blood  Preliminary Report (07 Nov 2017 11:01):    No growth to date.    Culture - Blood (collected 06 Nov 2017 10:35)  Source: .Blood Blood  Gram Stain (prelim) (07 Nov 2017 02:06):    Growth in aerobic bottle: Gram Negative Rods  Preliminary Report (07 Nov 2017 17:36):    Growth in aerobic bottle: Escherichia coli    ***Blood Panel PCR results on this specimen are available    approximately 3 hours after the Gram stain result.***    Gram stain, PCR, and/or culture results may not always    correspond due to difference in methodologies.    ************************************************************    This PCR assay was performed using [employer].    The following targets are tested for: Enterococcus,    vancomycin resistant enterococci, Listeria monocytogenes,    coagulase negative staphylococci, S. aureus,    methicillin resistant S. aureus, Streptococcus agalactiae    (Group B), S. pneumoniae, S. pyogenes (Group A),    Acinetobacter baumannii, Enterobacter cloacae, E. coli,    Klebsiella oxytoca, K. pneumoniae, Proteus sp.,    Serratia marcescens, Haemophilus influenzae,    Neisseria meningitidis, Pseudomonas aeruginosa, Candida    albicans, C. glabrata, C krusei, C parapsilosis,    C. tropicalis and the KPC resistance gene.  Organism: Blood Culture PCR (07 Nov 2017 03:56)  Organism: Blood Culture PCR (07 Nov 2017 03:56)

## 2017-11-08 NOTE — PROGRESS NOTE ADULT - SUBJECTIVE AND OBJECTIVE BOX
Patient is a 82y old  Female who presents with a chief complaint of fever (06 Nov 2017 12:55)      INTERVAL HPI / OVERNIGHT EVENTS: doing better, denies any c/o ,no fever    MEDICATIONS  (STANDING):  amLODIPine   Tablet 5 milliGRAM(s) Oral daily  aspirin enteric coated 81 milliGRAM(s) Oral daily  cefTRIAXone   IVPB 1 Gram(s) IV Intermittent every 24 hours  dextrose 5%. 1000 milliLiter(s) (50 mL/Hr) IV Continuous <Continuous>  dextrose 50% Injectable 12.5 Gram(s) IV Push once  dextrose 50% Injectable 25 Gram(s) IV Push once  dextrose 50% Injectable 25 Gram(s) IV Push once  gabapentin 600 milliGRAM(s) Oral two times a day  heparin  Injectable 5000 Unit(s) SubCutaneous every 12 hours  influenza   Vaccine 0.5 milliLiter(s) IntraMuscular once  insulin lispro (HumaLOG) corrective regimen sliding scale   SubCutaneous three times a day before meals  insulin lispro (HumaLOG) corrective regimen sliding scale   SubCutaneous at bedtime  losartan 100 milliGRAM(s) Oral daily  metoprolol     tartrate 25 milliGRAM(s) Oral two times a day  pentoxifylline 400 milliGRAM(s) Oral three times a day  simvastatin 20 milliGRAM(s) Oral at bedtime    MEDICATIONS  (PRN):  acetaminophen   Tablet 650 milliGRAM(s) Oral every 6 hours PRN For Temp greater than 38 C (100.4 F)  dextrose Gel 1 Dose(s) Oral once PRN Blood Glucose LESS THAN 70 milliGRAM(s)/deciliter  glucagon  Injectable 1 milliGRAM(s) IntraMuscular once PRN Glucose LESS THAN 70 milligrams/deciliter      Vital Signs Last 24 Hrs  Tmax :afebrile    PHYSICAL EXAM:  General :NAD  Constitutional:  well-groomed, well-developed  Respiratory: CTAB/L  Cardiovascular: S1 and S2, RRR, no M/G/R  Gastrointestinal: BS+, soft, NT/ND  Extremities: No peripheral edema  Vascular: 2+ peripheral pulses  Skin: No rashes      LABS:                        10.4   7.9   )-----------( 169      ( 08 Nov 2017 07:20 )             32.4     11-08    137  |  104  |  18  ----------------------------<  177<H>  3.7   |  20<L>  |  1.45<H>    Ca    8.4<L>      08 Nov 2017 07:20            MICROBIOLOGY:  RECENT CULTURES:  11-06 .Urine Clean Catch (Midstream) Escherichia coli XXXX   >100,000 CFU/ml Escherichia coli    11-06 .Blood Blood Blood Culture PCR  Escherichia coli   Growth in aerobic bottle: Gram Negative Rods   Growth in aerobic bottle: Escherichia coli  ***Blood Panel PCR results on this specimen are available  approximately 3 hours after the Gram stain result.***  Gram stain, PCR, and/or culture results may not always  correspond due to difference in methodologies.  ************************************************************  This PCR assay was performed using [employer].  The following targets are tested for: Enterococcus,  vancomycin resistant enterococci, Listeria monocytogenes,  coagulase negative staphylococci, S. aureus,  methicillin resistant S. aureus, Streptococcus agalactiae  (Group B), S. pneumoniae, S. pyogenes (Group A),  Acinetobacter baumannii, Enterobacter cloacae, E. coli,  Klebsiella oxytoca, K. pneumoniae, Proteus sp.,  Serratia marcescens, Haemophilus influenzae,  Neisseria meningitidis, Pseudomonas aeruginosa, Candida  albicans, C. glabrata, C krusei, C parapsilosis,  C. tropicalis and the KPC resistance gene.          RADIOLOGY & ADDITIONAL STUDIES:

## 2017-11-08 NOTE — PHYSICAL THERAPY INITIAL EVALUATION ADULT - IMPAIRMENTS FOUND, PT EVAL
gait, locomotion, and balance/ergonomics and body mechanics/muscle strength/aerobic capacity/endurance

## 2017-11-08 NOTE — PROGRESS NOTE ADULT - ASSESSMENT
83 yo lady with a pmhx of HTN, HL, IDDM2 who presents to the ED with cough and fever since yesterday, and chest pain. Daughter says pt is less responsive.  Denied dyspnea; no hx of CAD.  Micro today with GNRs; being treated for sepsis.    Mina positive to 1 and now trending down; EKG with inferior/inferolateral ST depressions.    - Mina trending down  -2D echo ordered for today  Will order Lipid profile. HgA1C 6.5%,   -cont supportive care for sepsis with abx. Will reduce IVF to 75cc/hr x 8hrs then DC since able to take PO.   -ischemic eval when stable and off abx; stress vs cath  Can DC Tele.    Continue meds:    amLODIPine   Tablet 5 milliGRAM(s) Oral daily  aspirin enteric coated 81 milliGRAM(s) Oral daily  dextrose 5%. 1000 milliLiter(s) (50 mL/Hr) IV Continuous <Continuous>  dextrose 50% Injectable 12.5 Gram(s) IV Push once  dextrose 50% Injectable 25 Gram(s) IV Push once  dextrose 50% Injectable 25 Gram(s) IV Push once  gabapentin 600 milliGRAM(s) Oral two times a day  heparin  Injectable 5000 Unit(s) SubCutaneous every 12 hours  influenza   Vaccine 0.5 milliLiter(s) IntraMuscular once  insulin lispro (HumaLOG) corrective regimen sliding scale   SubCutaneous three times a day before meals  insulin lispro (HumaLOG) corrective regimen sliding scale   SubCutaneous at bedtime  losartan 100 milliGRAM(s) Oral daily  metoprolol     tartrate 25 milliGRAM(s) Oral two times a day  pentoxifylline 400 milliGRAM(s) Oral three times a day  piperacillin/tazobactam IVPB. 3.375 Gram(s) IV Intermittent every 12 hours  sodium chloride 0.9%. 1000 milliLiter(s) (100 mL/Hr) IV Continuous <Continuous>  vancomycin  IVPB

## 2017-11-08 NOTE — PHYSICAL THERAPY INITIAL EVALUATION ADULT - CRITERIA FOR SKILLED THERAPEUTIC INTERVENTIONS
impairments found/functional limitations in following categories/anticipated equipment needs at discharge/anticipated discharge recommendation

## 2017-11-09 ENCOUNTER — TRANSCRIPTION ENCOUNTER (OUTPATIENT)
Age: 82
End: 2017-11-09

## 2017-11-09 LAB
GLUCOSE BLDC GLUCOMTR-MCNC: 126 MG/DL — HIGH (ref 70–99)
GLUCOSE BLDC GLUCOMTR-MCNC: 171 MG/DL — HIGH (ref 70–99)
GLUCOSE BLDC GLUCOMTR-MCNC: 188 MG/DL — HIGH (ref 70–99)
GLUCOSE BLDC GLUCOMTR-MCNC: 222 MG/DL — HIGH (ref 70–99)

## 2017-11-09 PROCEDURE — 99222 1ST HOSP IP/OBS MODERATE 55: CPT

## 2017-11-09 PROCEDURE — 99233 SBSQ HOSP IP/OBS HIGH 50: CPT

## 2017-11-09 RX ORDER — LANOLIN ALCOHOL/MO/W.PET/CERES
3 CREAM (GRAM) TOPICAL ONCE
Qty: 0 | Refills: 0 | Status: COMPLETED | OUTPATIENT
Start: 2017-11-09 | End: 2017-11-09

## 2017-11-09 RX ORDER — REGADENOSON 0.08 MG/ML
0.4 INJECTION, SOLUTION INTRAVENOUS ONCE
Qty: 0 | Refills: 0 | Status: COMPLETED | OUTPATIENT
Start: 2017-11-09 | End: 2017-11-10

## 2017-11-09 RX ORDER — CIPROFLOXACIN LACTATE 400MG/40ML
500 VIAL (ML) INTRAVENOUS EVERY 12 HOURS
Qty: 0 | Refills: 0 | Status: DISCONTINUED | OUTPATIENT
Start: 2017-11-09 | End: 2017-11-10

## 2017-11-09 RX ADMIN — Medication 25 MILLIGRAM(S): at 17:20

## 2017-11-09 RX ADMIN — Medication 2: at 16:33

## 2017-11-09 RX ADMIN — Medication 400 MILLIGRAM(S): at 16:29

## 2017-11-09 RX ADMIN — Medication 25 MILLIGRAM(S): at 05:35

## 2017-11-09 RX ADMIN — AMLODIPINE BESYLATE 5 MILLIGRAM(S): 2.5 TABLET ORAL at 05:35

## 2017-11-09 RX ADMIN — Medication 3 MILLIGRAM(S): at 23:08

## 2017-11-09 RX ADMIN — GABAPENTIN 600 MILLIGRAM(S): 400 CAPSULE ORAL at 05:35

## 2017-11-09 RX ADMIN — Medication 400 MILLIGRAM(S): at 22:19

## 2017-11-09 RX ADMIN — HEPARIN SODIUM 5000 UNIT(S): 5000 INJECTION INTRAVENOUS; SUBCUTANEOUS at 17:17

## 2017-11-09 RX ADMIN — LOSARTAN POTASSIUM 100 MILLIGRAM(S): 100 TABLET, FILM COATED ORAL at 05:35

## 2017-11-09 RX ADMIN — Medication 500 MILLIGRAM(S): at 17:17

## 2017-11-09 RX ADMIN — Medication 400 MILLIGRAM(S): at 05:35

## 2017-11-09 RX ADMIN — GABAPENTIN 600 MILLIGRAM(S): 400 CAPSULE ORAL at 17:17

## 2017-11-09 RX ADMIN — HEPARIN SODIUM 5000 UNIT(S): 5000 INJECTION INTRAVENOUS; SUBCUTANEOUS at 05:36

## 2017-11-09 RX ADMIN — Medication 4: at 11:43

## 2017-11-09 RX ADMIN — Medication 2: at 08:26

## 2017-11-09 RX ADMIN — Medication 81 MILLIGRAM(S): at 12:34

## 2017-11-09 RX ADMIN — SIMVASTATIN 20 MILLIGRAM(S): 20 TABLET, FILM COATED ORAL at 22:19

## 2017-11-09 NOTE — DISCHARGE NOTE ADULT - PATIENT PORTAL LINK FT
“You can access the [facility] Patient Portal, offered by [facility], by registering with the following website: [email or web address]”

## 2017-11-09 NOTE — PROGRESS NOTE ADULT - SUBJECTIVE AND OBJECTIVE BOX
Patient is a 82y old  Female who presents with a chief complaint of fever (09 Nov 2017 10:56)      INTERVAL HPI/ OVERNIGHT EVENTS: Pt was seen and examined at bedside today, No significant overnight events, pt states that she feeling better today, still has weakness, denies CP and SOB.      MEDICATIONS  (STANDING):  amLODIPine   Tablet 5 milliGRAM(s) Oral daily  aspirin enteric coated 81 milliGRAM(s) Oral daily  ciprofloxacin     Tablet 500 milliGRAM(s) Oral every 12 hours  dextrose 5%. 1000 milliLiter(s) (50 mL/Hr) IV Continuous <Continuous>  dextrose 50% Injectable 12.5 Gram(s) IV Push once  dextrose 50% Injectable 25 Gram(s) IV Push once  dextrose 50% Injectable 25 Gram(s) IV Push once  gabapentin 600 milliGRAM(s) Oral two times a day  heparin  Injectable 5000 Unit(s) SubCutaneous every 12 hours  influenza   Vaccine 0.5 milliLiter(s) IntraMuscular once  insulin lispro (HumaLOG) corrective regimen sliding scale   SubCutaneous three times a day before meals  insulin lispro (HumaLOG) corrective regimen sliding scale   SubCutaneous at bedtime  losartan 100 milliGRAM(s) Oral daily  metoprolol     tartrate 25 milliGRAM(s) Oral two times a day  pentoxifylline 400 milliGRAM(s) Oral three times a day  simvastatin 20 milliGRAM(s) Oral at bedtime    MEDICATIONS  (PRN):  acetaminophen   Tablet 650 milliGRAM(s) Oral every 6 hours PRN For Temp greater than 38 C (100.4 F)  dextrose Gel 1 Dose(s) Oral once PRN Blood Glucose LESS THAN 70 milliGRAM(s)/deciliter  glucagon  Injectable 1 milliGRAM(s) IntraMuscular once PRN Glucose LESS THAN 70 milligrams/deciliter      Allergies    No Known Allergies    Intolerances        REVIEW OF SYSTEMS:    CONSTITUTIONAL: No fever, positive generalized weakness/Fatigue, No weight loss  EYES: No eye pain, visual disturbances, or discharge  ENMT:  No difficulty hearing, tinnitus, vertigo; No sinus or throat pain  NECK: No pain or stiffness  RESPIRATORY: No shortness of breath,  cough, wheezing, chills or hemoptysis   CARDIOVASCULAR: No chest pain, palpitations, or leg swelling  GASTROINTESTINAL: No abdominal pain. No nausea, vomiting, diarrhea or constipation. No melena or hematochezia.  GENITOURINARY: No dysuria, frequency, hematuria, or incontinence  NEUROLOGICAL: No headaches, Dizziness, memory loss, loss of strength, numbness, or tremors  SKIN: No itching, burning, rashes, or lesions   MUSCULOSKELETAL: No joint pain or swelling; No muscle, back, or extremity pain  PSYCHIATRIC: No depression, anxiety, mood swings, or difficulty sleeping  HEME/LYMPH: No easy bruising, or bleeding gums  ALLERGY AND IMMUNOLOGIC: No hives or eczema    Vital Signs Last 24 Hrs  T(C): 36.9 (09 Nov 2017 12:27), Max: 37.2 (08 Nov 2017 16:40)  T(F): 98.4 (09 Nov 2017 12:27), Max: 99 (08 Nov 2017 16:40)  HR: 65 (09 Nov 2017 12:27) (61 - 78)  BP: 133/83 (09 Nov 2017 12:27) (128/67 - 161/50)  BP(mean): --  RR: 16 (09 Nov 2017 12:27) (16 - 17)  SpO2: 100% (09 Nov 2017 12:27) (97% - 100%)    PHYSICAL EXAM:  GENERAL: NAD, well-groomed, well-developed  HEAD:  Atraumatic, Normocephalic  EYES: EOMI, PERRLA, conjunctiva and sclera clear  ENMT: Moist mucous membranes  NECK: Supple, No JVD, Normal thyroid  CHEST/LUNG: Clear to Auscultation bilaterally; No rales, rhonchi, wheezing, or rubs  HEART: Regular rate and rhythm; No murmurs, rubs, or gallops  ABDOMEN: Soft, Nontender, Nondistended; Bowel sounds present  EXTREMITIES:  2+ Peripheral Pulses, No clubbing, cyanosis, or edema  LYMPH: No lymphadenopathy noted  SKIN: No rashes or lesions  NERVOUS SYSTEM:  Alert & Oriented X3, Good concentration; Motor Strength 5/5 B/L upper and lower extremities; DTRs 2+ intact and symmetric    LABS:                        10.4   7.9   )-----------( 169      ( 08 Nov 2017 07:20 )             32.4     11-08    137  |  104  |  18  ----------------------------<  177<H>  3.7   |  20<L>  |  1.45<H>    Ca    8.4<L>      08 Nov 2017 07:20          CAPILLARY BLOOD GLUCOSE      POCT Blood Glucose.: 222 mg/dL (09 Nov 2017 11:42)  POCT Blood Glucose.: 188 mg/dL (09 Nov 2017 08:09)  POCT Blood Glucose.: 212 mg/dL (08 Nov 2017 22:12)  POCT Blood Glucose.: 190 mg/dL (08 Nov 2017 17:04)        Culture - Urine (collected 11-06-17)  Source: .Urine Clean Catch (Midstream)  Final Report (11-08-17):    >100,000 CFU/ml Escherichia coli  Organism: Escherichia coli (11-08-17)  Organism: Escherichia coli (11-08-17)    Sensitivities:      -  Amikacin: S <=8      -  Ampicillin: R >16      -  Ampicillin/Sulbactam: R >16/8      -  Aztreonam: S <=4      -  Cefazolin: I 16      -  Cefepime: S <=2      -  Cefoxitin: S <=4      -  Ceftazidime: S <=1      -  Ceftriaxone: S <=1      -  Ciprofloxacin: S <=0.5      -  Ertapenem: S <=0.5      -  Gentamicin: S <=1      -  Imipenem: S <=1      -  Levofloxacin: S <=1      -  Meropenem: S <=1      -  Nitrofurantoin: S <=32      -  Piperacillin/Tazobactam: S 16      -  Tobramycin: S <=2      -  Trimethoprim/Sulfamethoxazole: R >2/38      Method Type: GABI    Culture - Blood (collected 11-06-17)  Source: .Blood Blood  Preliminary Report (11-07-17):    No growth to date.    Culture - Blood (collected 11-06-17)  Source: .Blood Blood  Gram Stain (11-08-17):    Growth in aerobic bottle: Gram Negative Rods  Final Report (11-08-17):    Growth in aerobic bottle: Escherichia coli    ***Blood Panel PCR results on this specimen are available    approximately 3 hours after the Gram stain result.***    Gram stain, PCR, and/or culture results may not always    correspond due to difference in methodologies.    ************************************************************    This PCR assay was performed using [employer].    The following targets are tested for: Enterococcus,    vancomycin resistant enterococci, Listeria monocytogenes,    coagulase negative staphylococci, S. aureus,    methicillin resistant S. aureus, Streptococcus agalactiae    (Group B), S. pneumoniae, S. pyogenes (Group A),    Acinetobacter baumannii, Enterobacter cloacae, E. coli,    Klebsiella oxytoca, K. pneumoniae, Proteus sp.,    Serratia marcescens, Haemophilus influenzae,    Neisseria meningitidis, Pseudomonas aeruginosa, Candida    albicans, C. glabrata, C krusei, C parapsilosis,    C. tropicalis and the KPC resistance gene.  Organism: Blood Culture PCR  Escherichia coli (11-08-17)  Organism: Escherichia coli (11-08-17)    Sensitivities:      -  Amikacin: S <=8      -  Ampicillin: R >16      -  Ampicillin/Sulbactam: R >16/8      -  Aztreonam: S <=4      -  Cefazolin: S 8      -  Cefepime: S <=2      -  Cefoxitin: S <=4      -  Ceftazidime: S <=1      -  Ceftriaxone: S <=1      -  Ciprofloxacin: S <=0.5      -  Ertapenem: S <=0.5      -  Gentamicin: S <=1      -  Imipenem: S <=1      -  Levofloxacin: S <=1      -  Meropenem: S <=1      -  Piperacillin/Tazobactam: S <=8      -  Tobramycin: S <=2      -  Trimethoprim/Sulfamethoxazole: R >2/38      Method Type: GABI  Organism: Blood Culture PCR (11-08-17)    Sensitivities:      -  Escherichia coli: Detec      Method Type: PCR        RADIOLOGY & ADDITIONAL TESTS:          Imaging Personally Reviewed:  [ ] YES  [ ] NO    Consultant(s) Notes Reviewed:  [ x] YES  [ ] NO    Care Discussed with Consultants/Other Providers [x ] YES  [ ] NO

## 2017-11-09 NOTE — DISCHARGE NOTE ADULT - MEDICATION SUMMARY - MEDICATIONS TO TAKE
I will START or STAY ON the medications listed below when I get home from the hospital:    aspirin 81 mg oral tablet  -- 1 tab(s) by mouth once a day  -- Indication: For Preventive measure    losartan 100 mg oral tablet  -- 1 tab(s) by mouth once a day  -- Indication: For Essential hypertension    gabapentin 600 mg oral tablet  -- 1 tab(s) by mouth 3 times a day  -- Indication: For Peripheral Neuropathy     glipiZIDE 5 mg oral tablet  -- 1 tab(s) by mouth once a day  -- Indication: For Type 2 diabetes mellitus without complication, without long-term current use of insulin    pioglitazone 30 mg oral tablet  -- 1 tab(s) by mouth once a day  -- Indication: For Type 2 diabetes mellitus without complication, without long-term current use of insulin    Tradjenta 5 mg oral tablet  -- 1 tab(s) by mouth once a day  -- Indication: For Type 2 diabetes mellitus without complication, without long-term current use of insulin    Zetia 10 mg oral tablet  -- 1 tab(s) by mouth once a day  -- Indication: For Mixed hyperlipidemia    metoprolol tartrate 25 mg oral tablet  -- 1 tab(s) by mouth 2 times a day  -- Indication: For Essential hypertension    amLODIPine 5 mg oral tablet  -- 1 tab(s) by mouth once a day  -- Indication: For Essential hypertension    pentoxifylline 400 mg oral tablet, extended release  -- 1 tab(s) by mouth 3 times a day  -- Indication: For Preventive measure    ciprofloxacin 500 mg oral tablet  -- 1 tab(s) by mouth every 12 hours  -- Indication: For E coli bacteremia

## 2017-11-09 NOTE — DISCHARGE NOTE ADULT - PLAN OF CARE
Renal function stabilized in stage III range, this is the patient presumed baseline. Follow up with PCP for management. Lower LDL, elevation of HDL will discontinue Simvastatin and start Lipitor, as patient requires higher dose for cholesterol control. Safe ambulation Physical therapy examined the patient.   Rolling walker was recommended along with home PT. complete antibiotic course Sepsis on admission.   E. Coli is sensitive to Ciprofloxacin, will continue   Pt is afebrile, no leukocytosis. Positive Troponins  Echo: was benign, EF 60-65%, diastolic dysfunction grade 1, Trace Mitral valve and Aortic valve regurgitation. Resolved most likely dehydration with Sepsis diagnosis, resolved with IV fluids. Sepsis on admission.   E. Coli is sensitive to Ciprofloxacin, will continue with this antibiotic until 11/16/2017.   Pt is afebrile, no leukocytosis. Positive Troponins  Echo: was benign, EF 60-65%, diastolic dysfunction grade 1, Trace Mitral valve and Aortic valve   regurgitation.  Nuclear Stress Test was done and was negative for acute ischemia.   Cardiology has cleared discharged, pt will follow up as outpatient.

## 2017-11-09 NOTE — PROGRESS NOTE ADULT - PROBLEM SELECTOR PROBLEM 2
Acute cystitis with positive culture
Acute cystitis with hematuria

## 2017-11-09 NOTE — DISCHARGE NOTE ADULT - MEDICATION SUMMARY - MEDICATIONS TO STOP TAKING
I will STOP taking the medications listed below when I get home from the hospital:    hydroCHLOROthiazide 12.5 mg oral tablet  -- 1 tab(s) by mouth once a day    simvastatin 20 mg oral tablet  -- 1 tab(s) by mouth once a day (at bedtime)

## 2017-11-09 NOTE — DISCHARGE NOTE ADULT - CARE PLAN
Principal Discharge DX:	E coli bacteremia  Goal:	complete antibiotic course  Instructions for follow-up, activity and diet:	Sepsis on admission.   E. Coli is sensitive to Ciprofloxacin, will continue   Pt is afebrile, no leukocytosis.  Secondary Diagnosis:	Cardiac ischemia  Instructions for follow-up, activity and diet:	Positive Troponins  Echo: was benign, EF 60-65%, diastolic dysfunction grade 1, Trace Mitral valve and Aortic valve regurgitation.  Secondary Diagnosis:	AVELINO (acute kidney injury)  Goal:	Resolved  Instructions for follow-up, activity and diet:	most likely dehydration with Sepsis diagnosis, resolved with IV fluids.  Secondary Diagnosis:	Stage 3 chronic kidney disease  Instructions for follow-up, activity and diet:	Renal function stabilized in stage III range, this is the patient presumed baseline. Follow up with PCP for management.  Secondary Diagnosis:	Mixed hyperlipidemia  Goal:	Lower LDL, elevation of HDL  Instructions for follow-up, activity and diet:	will discontinue Simvastatin and start Lipitor, as patient requires higher dose for cholesterol control.  Secondary Diagnosis:	Weakness  Goal:	Safe ambulation  Instructions for follow-up, activity and diet:	Physical therapy examined the patient.   Rolling walker was recommended along with home PT. Principal Discharge DX:	E coli bacteremia  Goal:	complete antibiotic course  Instructions for follow-up, activity and diet:	Sepsis on admission.   E. Coli is sensitive to Ciprofloxacin, will continue with this antibiotic until 11/16/2017.   Pt is afebrile, no leukocytosis.  Secondary Diagnosis:	Cardiac ischemia  Instructions for follow-up, activity and diet:	Positive Troponins  Echo: was benign, EF 60-65%, diastolic dysfunction grade 1, Trace Mitral valve and Aortic valve   regurgitation.  Nuclear Stress Test was done and was negative for acute ischemia.   Cardiology has cleared discharged, pt will follow up as outpatient.  Secondary Diagnosis:	AVELINO (acute kidney injury)  Goal:	Resolved  Instructions for follow-up, activity and diet:	most likely dehydration with Sepsis diagnosis, resolved with IV fluids.  Secondary Diagnosis:	Stage 3 chronic kidney disease  Instructions for follow-up, activity and diet:	Renal function stabilized in stage III range, this is the patient presumed baseline. Follow up with PCP for management.  Secondary Diagnosis:	Mixed hyperlipidemia  Goal:	Lower LDL, elevation of HDL  Instructions for follow-up, activity and diet:	will discontinue Simvastatin and start Lipitor, as patient requires higher dose for cholesterol control.  Secondary Diagnosis:	Weakness  Goal:	Safe ambulation  Instructions for follow-up, activity and diet:	Physical therapy examined the patient.   Rolling walker was recommended along with home PT.

## 2017-11-09 NOTE — DISCHARGE NOTE ADULT - HOSPITAL COURSE
82 year old Female with Past history of HTN, HLD, and IDDM2 presented to the ED with cough, fever, weakness and chest pain. On workup the patient was febrile and meet criteria for Sepsis. No leukocytosis, Urinalysis was positive. Chest Xray showed no infiltrate and Abdominal US failed to show cholecystitis. She was admitted in sepsis protocol and was started on IV abx. Blood cultures and urine cultures were done and came back as positive for E. coli. Infectious Disease was consulted and managed the patient will admitted. Sensitivities for E.coli were done and it was sensitive to ciprofloxacin. The patient's antibiotics was downgraded to oral Ciprofloxacin which the patient tolerated yesterday. The patient's Sepsis resolved and she will continue oral Cipro for remaining course. The patient also was noted to have renal failure on workup. She was started on IV fluids and her renal function improved. Her renal function stabilized at a stage 3 ranged. It is presumed that the patient has CKD III at baseline along with AOCD. Further testing for this can be done as outpatient bases with PCP.       On Chest pain workup the Chest X-ray showed cardiomegaly. EKG revealed inferior and lateral leads ST depression. Her Cardiac enzymes were positive. Admitted to telemetry bed with serial CE that downtrended. Cardiology was consulted and Echo was recommended. Echo was benign with preserved ejection fraction. Further ischemic workup with Cardiac cath was discussed with cardiologist and patient and it was declined. Nuclear Stress was recommended than for which the patient completed. HgA1c was 6.5%, HL 41, and . The patient statin was changed to Lipitor in order to have increase in dose. The pt is on Norvasc simvastatin is contraindicated to increase any further. 82 year old Female with Past history of HTN, HLD, and IDDM2 presented to the ED with cough, fever, weakness and chest pain. On workup the patient was febrile and meet criteria for Sepsis. No leukocytosis, Urinalysis was positive. Chest Xray showed no infiltrate and Abdominal US failed to show cholecystitis. She was admitted in sepsis protocol and was started on IV abx. Blood cultures and urine cultures were done and came back as positive for E. coli. Infectious Disease was consulted and managed the patient will admitted. Sensitivities for E.coli were done and it was sensitive to ciprofloxacin. The patient's antibiotics was downgraded to oral Ciprofloxacin which the patient tolerated yesterday. The patient's Sepsis resolved and she will continue oral Cipro for remaining course. The patient also was noted to have renal failure on workup. She was started on IV fluids and her renal function improved. Her renal function stabilized at a stage 3 ranged. It is presumed that the patient has CKD III at baseline along with AOCD. Further testing for this can be done as outpatient bases with PCP.       On Chest pain workup the Chest X-ray showed cardiomegaly. EKG revealed inferior and lateral leads ST depression. Her Cardiac enzymes were positive. Admitted to telemetry bed with serial CE that downtrended. Cardiology was consulted and Echo was recommended. Echo was benign with preserved ejection fraction. Further ischemic workup with Cardiac cath was discussed with cardiologist and patient and it was declined. Nuclear Stress was recommended. Nuclear Stress test was done and was negative for ischemia. Her HgA1c was 6.5%, HL 41, and . The patient's statin was changed to Lipitor in order to have increase in dose. The pt is on Norvasc simvastatin is contraindicated to increase any further.

## 2017-11-09 NOTE — PROGRESS NOTE ADULT - PROBLEM SELECTOR PROBLEM 5
Type 2 diabetes mellitus without complication, without long-term current use of insulin
AVELINO (acute kidney injury)
AVELINO (acute kidney injury)
Weakness

## 2017-11-09 NOTE — PROGRESS NOTE ADULT - ASSESSMENT
83 yo lady with a pmhx of HTN, HL, DM who presents to the ED with cough, fever, chest pain, and less responsive.  Denied dyspnea; no hx of CAD.  Micro  with GNRs; being treated for sepsis.    Mina positive to 1 and now trending down; EKG with inferior/inferolateral ST depressions.    -2D echo with preserved EF, Grade I diastolic dysfunction  HgA1C 6.5%,   LDL:   HDL Chol:              41 mg/dL  Serum Chol:            185 mg/dL  Direct LDL:            116 mg/dL  Triglycerides:         139 mg/dL  -cont management of r sepsis with abx.   Diabetic management  C/w asa, statin, bbl, ARB, Norvasc will increase Norvasc if BP remains elevated  -ischemic eval when stable and off abx; stress vs cath 83 yo lady with a pmhx of HTN, HL, DM who presents to the ED with cough, fever, chest pain, and less responsive.  Denied dyspnea; no hx of CAD.  Micro  with GNRs; being treated for sepsis.    Mina positive to 1 and now trending down; EKG with inferior/inferolateral ST depressions.    -2D echo with preserved EF, Grade I diastolic dysfunction  HgA1C 6.5%,   LDL:   HDL Chol:              41 mg/dL  Serum Chol:            185 mg/dL  Direct LDL:            116 mg/dL  Triglycerides:         139 mg/dL  -cont management of  sepsis with abx.   Diabetic management  C/w asa, statin, bbl, ARB, Norvasc will increase Norvasc if BP remains elevated  -d/w pt and her daughters regarding ischemic cardiac w/u in setting of elevated troponin with ekg changes, likely cardiac cath, opt not to have cardiac cath, agreed for nuclear stress test, which could then guide with medical management

## 2017-11-09 NOTE — PROGRESS NOTE ADULT - PROBLEM SELECTOR PLAN 3
resolved
+ trop, old ekg ischemic changes, pt denies chest pain, cardiology note appreciated, will f/u Echo, ekg and serial CE
Pt denies CP, +trop, ischemic ST depression EKG, Cardiology on board, note appreciated, Pt for ischemic workup once infection is resolved, will f/u
down trending CE, cardiology note appreciated, will f/u Echo results

## 2017-11-09 NOTE — PROGRESS NOTE ADULT - PROBLEM SELECTOR PLAN 4
resolved  adjust antibiotics renally
Continue FS monitoring with insulin s/s coverage

## 2017-11-09 NOTE — DISCHARGE NOTE ADULT - OTHER SIGNIFICANT FINDINGS
NM Stress Test, Dual Isotope (11.10.17 @ 14:24)   FINDINGS: The technical quality of the resting and post-stress perfusion   images was good.  Review of resting and post-stress myocardial   scintigrams revealed normal left ventricular perfusion. There was no   evidence of reversible or fixed perfusion defects.    Gated wall motion study revealed normal left ventricular contractility.   There were no regional wall motion abnormalities or regions of decreased   wall thickening. There was no paradoxical septal motion.     Calculated left ventricular ejection fraction post-stress was 90%, based   on a left ventricular end diastolic volume of 80 mL and an end systolic   volume of 8 mL. Stroke volume was 72 mL.     IMPRESSION: Normal SPECT Myocardial Perfusion Imaging.   Normal left ventricular function with an ejection fraction of 90%   (Normal: 50% or greater).  No regional wall motion abnormalities.  No clear evidence of reversible or fixed perfusion defects.

## 2017-11-09 NOTE — PROGRESS NOTE ADULT - PROBLEM SELECTOR PLAN 5
cont meds
improving, continue with IVF and monitor renal function and electrolytes
rehab consult appreciated, rolling walker with home pt on discharge
resolved, pt is tolerating PO diet and is possibly getting fluid overloaded, IVF was d/c

## 2017-11-09 NOTE — DISCHARGE NOTE ADULT - NSFTFAOTHERFT_GEN_ALL_CORE
HHA  Eval, MSW  Eval  for  Long-term Planning, Community  Resource  Planning, MLTC or  HA  eligibility.

## 2017-11-09 NOTE — PROGRESS NOTE ADULT - SUBJECTIVE AND OBJECTIVE BOX
Patient is a 82y old  Female who presents with a chief complaint of fever (09 Nov 2017 10:56)      PAST MEDICAL & SURGICAL HISTORY:  HTN (hypertension)  Diabetes  No significant past surgical history      INTERVAL HISTORY: Resting in bed, not in any distress  	  MEDICATIONS:  MEDICATIONS  (STANDING):  amLODIPine   Tablet 5 milliGRAM(s) Oral daily  aspirin enteric coated 81 milliGRAM(s) Oral daily  ciprofloxacin     Tablet 500 milliGRAM(s) Oral every 12 hours  dextrose 5%. 1000 milliLiter(s) (50 mL/Hr) IV Continuous <Continuous>  dextrose 50% Injectable 12.5 Gram(s) IV Push once  dextrose 50% Injectable 25 Gram(s) IV Push once  dextrose 50% Injectable 25 Gram(s) IV Push once  gabapentin 600 milliGRAM(s) Oral two times a day  heparin  Injectable 5000 Unit(s) SubCutaneous every 12 hours  influenza   Vaccine 0.5 milliLiter(s) IntraMuscular once  insulin lispro (HumaLOG) corrective regimen sliding scale   SubCutaneous three times a day before meals  insulin lispro (HumaLOG) corrective regimen sliding scale   SubCutaneous at bedtime  losartan 100 milliGRAM(s) Oral daily  metoprolol     tartrate 25 milliGRAM(s) Oral two times a day  pentoxifylline 400 milliGRAM(s) Oral three times a day  simvastatin 20 milliGRAM(s) Oral at bedtime    MEDICATIONS  (PRN):  acetaminophen   Tablet 650 milliGRAM(s) Oral every 6 hours PRN For Temp greater than 38 C (100.4 F)  dextrose Gel 1 Dose(s) Oral once PRN Blood Glucose LESS THAN 70 milliGRAM(s)/deciliter  glucagon  Injectable 1 milliGRAM(s) IntraMuscular once PRN Glucose LESS THAN 70 milligrams/deciliter      Vitals:  T(F): 97.9 (11-09-17 @ 04:54), Max: 99 (11-08-17 @ 16:40)  HR: 67 (11-09-17 @ 04:54) (61 - 84)  BP: 161/50 (11-09-17 @ 04:54) (128/67 - 161/50)  RR: 16 (11-09-17 @ 04:54) (16 - 17)  SpO2: 100% (11-09-17 @ 04:54) (97% - 100%)  Wt(kg): -- 79 kg    11-08 @ 07:01 - 11-09 @ 07:00  --------------------------------------------------------  IN:    Oral Fluid: 480 mL  Total IN: 480 mL    OUT:  Total OUT: 0 mL    Total NET: 480 mL        Weight (kg): 79.4 (11-06 @ 12:52)      PHYSICAL EXAM:  Neuro: Awake, responsive  CV: S1 S2 RRR  Lungs: CTABL  GI: Soft, BS +, ND, NT  Extremities: No edema        RADIOLOGY: < from: Xray Chest 1 View AP/PA. (11.06.17 @ 08:33) >  Cardiomegaly. Clear lungs.    < end of copied text >      DIAGNOSTIC TESTING:    [x ] Echocardiogram: < from: TTE Echo Doppler w/o Cont (11.08.17 @ 16:33) >  1. Left ventricular ejection fraction, by visual estimation, is 60 to   65%.   2. Spectral Doppler shows impaired relaxation pattern of left   ventricular myocardial filling (Grade I diastolic dysfunction).   3. Normal right ventricular size and function.   4. The left atrium is normal in size.   5. The right atrium is normal in size.   6. There is no evidence of pericardial effusion.   7. Structurally normal mitral valve, with normal leaflet excursion.   8. Trace mitral valve regurgitation.   9. Structurally normal tricuspid valve, with normal leaflet excursion.  10. Mild aortic regurgitation.  11. Normal trileaflet aortic valve with normal opening.  12. Structurally normal pulmonic valve, with normal leaflet excursion.  13. Estimated pulmonary artery systolic pressure is 46.2 mmHg assuming a   right atrial pressure of 5 mmHg, which is consistent with mild pulmonary   hypertension.    < end of copied text >    LABS:	 	    CARDIAC MARKERS:  Troponin I, Serum: .485 ng/mL (11-08 @ 07:20)  Troponin I, Serum: .847 ng/mL (11-07 @ 10:46)  Troponin I, Serum: 1.030 ng/mL (11-07 @ 01:12)  Troponin I, Serum: .739 ng/mL (11-06 @ 22:06)  Troponin I, Serum: .943 ng/mL (11-06 @ 15:54)          08 Nov 2017 07:20    137    |  104    |  18     ----------------------------<  177    3.7     |  20     |  1.45   07 Nov 2017 10:46    138    |  105    |  28     ----------------------------<  130    3.7     |  25     |  1.69   06 Nov 2017 22:06    139    |  104    |  37     ----------------------------<  121    3.8     |  24     |  2.13     Ca    8.4        08 Nov 2017 07:20                            10.4   7.9   )-----------( 169      ( 08 Nov 2017 07:20 )             32.4 ,                       9.6    8.3   )-----------( 172      ( 07 Nov 2017 10:46 )             29.4 ,                       9.3    8.6   )-----------( 146      ( 06 Nov 2017 22:06 )             28.5     Lipid Profile: 11-08 @ 12:41  HDL/Total Cholesterol: --  HDL Chol:              41 mg/dL  Serum Chol:            185 mg/dL  Direct LDL:            116 mg/dL  Triglycerides:         139 mg/dL    HgA1c: Hemoglobin A1C, Whole Blood: 6.5 % (11-07 @ 15:30) Patient is a 82y old  Female who presents with a chief complaint of fever (09 Nov 2017 10:56)      PAST MEDICAL & SURGICAL HISTORY:  HTN (hypertension)  Diabetes  No significant past surgical history      INTERVAL HISTORY: Resting in bed, not in any distress, no co chest pain, sob or dizziness  	  MEDICATIONS:  MEDICATIONS  (STANDING):  amLODIPine   Tablet 5 milliGRAM(s) Oral daily  aspirin enteric coated 81 milliGRAM(s) Oral daily  ciprofloxacin     Tablet 500 milliGRAM(s) Oral every 12 hours  dextrose 5%. 1000 milliLiter(s) (50 mL/Hr) IV Continuous <Continuous>  dextrose 50% Injectable 12.5 Gram(s) IV Push once  dextrose 50% Injectable 25 Gram(s) IV Push once  dextrose 50% Injectable 25 Gram(s) IV Push once  gabapentin 600 milliGRAM(s) Oral two times a day  heparin  Injectable 5000 Unit(s) SubCutaneous every 12 hours  influenza   Vaccine 0.5 milliLiter(s) IntraMuscular once  insulin lispro (HumaLOG) corrective regimen sliding scale   SubCutaneous three times a day before meals  insulin lispro (HumaLOG) corrective regimen sliding scale   SubCutaneous at bedtime  losartan 100 milliGRAM(s) Oral daily  metoprolol     tartrate 25 milliGRAM(s) Oral two times a day  pentoxifylline 400 milliGRAM(s) Oral three times a day  simvastatin 20 milliGRAM(s) Oral at bedtime    MEDICATIONS  (PRN):  acetaminophen   Tablet 650 milliGRAM(s) Oral every 6 hours PRN For Temp greater than 38 C (100.4 F)  dextrose Gel 1 Dose(s) Oral once PRN Blood Glucose LESS THAN 70 milliGRAM(s)/deciliter  glucagon  Injectable 1 milliGRAM(s) IntraMuscular once PRN Glucose LESS THAN 70 milligrams/deciliter      Vitals:  T(F): 97.9 (11-09-17 @ 04:54), Max: 99 (11-08-17 @ 16:40)  HR: 67 (11-09-17 @ 04:54) (61 - 84)  BP: 161/50 (11-09-17 @ 04:54) (128/67 - 161/50)  RR: 16 (11-09-17 @ 04:54) (16 - 17)  SpO2: 100% (11-09-17 @ 04:54) (97% - 100%)  Wt(kg): -- 79 kg    11-08 @ 07:01  -  11-09 @ 07:00  --------------------------------------------------------  IN:    Oral Fluid: 480 mL  Total IN: 480 mL    OUT:  Total OUT: 0 mL    Total NET: 480 mL      Weight (kg): 79.4 (11-06 @ 12:52)      PHYSICAL EXAM:  Neuro: Awake, responsive  CV: S1 S2 RRR, + soft murmur  Lungs: CTABL  GI: Soft, BS +, ND, NT  Extremities: No edema        RADIOLOGY: < from: Xray Chest 1 View AP/PA. (11.06.17 @ 08:33) >  Cardiomegaly. Clear lungs.    < end of copied text >      DIAGNOSTIC TESTING:    [x ] Echocardiogram: < from: TTE Echo Doppler w/o Cont (11.08.17 @ 16:33) >  1. Left ventricular ejection fraction, by visual estimation, is 60 to   65%.   2. Spectral Doppler shows impaired relaxation pattern of left   ventricular myocardial filling (Grade I diastolic dysfunction).   3. Normal right ventricular size and function.   4. The left atrium is normal in size.   5. The right atrium is normal in size.   6. There is no evidence of pericardial effusion.   7. Structurally normal mitral valve, with normal leaflet excursion.   8. Trace mitral valve regurgitation.   9. Structurally normal tricuspid valve, with normal leaflet excursion.  10. Mild aortic regurgitation.  11. Normal trileaflet aortic valve with normal opening.  12. Structurally normal pulmonic valve, with normal leaflet excursion.  13. Estimated pulmonary artery systolic pressure is 46.2 mmHg assuming a   right atrial pressure of 5 mmHg, which is consistent with mild pulmonary   hypertension.    < end of copied text >    LABS:	 	    CARDIAC MARKERS:  Troponin I, Serum: .485 ng/mL (11-08 @ 07:20)  Troponin I, Serum: .847 ng/mL (11-07 @ 10:46)  Troponin I, Serum: 1.030 ng/mL (11-07 @ 01:12)  Troponin I, Serum: .739 ng/mL (11-06 @ 22:06)  Troponin I, Serum: .943 ng/mL (11-06 @ 15:54)          08 Nov 2017 07:20    137    |  104    |  18     ----------------------------<  177    3.7     |  20     |  1.45   07 Nov 2017 10:46    138    |  105    |  28     ----------------------------<  130    3.7     |  25     |  1.69   06 Nov 2017 22:06    139    |  104    |  37     ----------------------------<  121    3.8     |  24     |  2.13     Ca    8.4        08 Nov 2017 07:20                            10.4   7.9   )-----------( 169      ( 08 Nov 2017 07:20 )             32.4 ,                       9.6    8.3   )-----------( 172      ( 07 Nov 2017 10:46 )             29.4 ,                       9.3    8.6   )-----------( 146      ( 06 Nov 2017 22:06 )             28.5     Lipid Profile: 11-08 @ 12:41  HDL/Total Cholesterol: --  HDL Chol:              41 mg/dL  Serum Chol:            185 mg/dL  Direct LDL:            116 mg/dL  Triglycerides:         139 mg/dL    HgA1c: Hemoglobin A1C, Whole Blood: 6.5 % (11-07 @ 15:30)

## 2017-11-09 NOTE — DISCHARGE NOTE ADULT - CARE PROVIDER_API CALL
Abeba Garcia), Cardiology; Internal Medicine; Interventional Cardiology  [address], [place] [id number]  Phone: [phone]  Fax: [phone]

## 2017-11-09 NOTE — DISCHARGE NOTE ADULT - SECONDARY DIAGNOSIS.
Mixed hyperlipidemia Weakness Cardiac ischemia AVELINO (acute kidney injury) Stage 3 chronic kidney disease

## 2017-11-09 NOTE — PROGRESS NOTE ADULT - PROBLEM SELECTOR PROBLEM 4
AVELINO (acute kidney injury)
Type 2 diabetes mellitus without complication, without long-term current use of insulin

## 2017-11-10 VITALS
RESPIRATION RATE: 17 BRPM | DIASTOLIC BLOOD PRESSURE: 44 MMHG | HEART RATE: 63 BPM | OXYGEN SATURATION: 99 % | TEMPERATURE: 99 F | SYSTOLIC BLOOD PRESSURE: 139 MMHG

## 2017-11-10 LAB
ANION GAP SERPL CALC-SCNC: 10 MMOL/L — SIGNIFICANT CHANGE UP (ref 5–17)
BUN SERPL-MCNC: 16 MG/DL — SIGNIFICANT CHANGE UP (ref 7–23)
CALCIUM SERPL-MCNC: 8.7 MG/DL — SIGNIFICANT CHANGE UP (ref 8.5–10.1)
CHLORIDE SERPL-SCNC: 108 MMOL/L — SIGNIFICANT CHANGE UP (ref 96–108)
CO2 SERPL-SCNC: 24 MMOL/L — SIGNIFICANT CHANGE UP (ref 22–31)
CREAT SERPL-MCNC: 1.24 MG/DL — SIGNIFICANT CHANGE UP (ref 0.5–1.3)
CULTURE RESULTS: SIGNIFICANT CHANGE UP
GLUCOSE BLDC GLUCOMTR-MCNC: 181 MG/DL — HIGH (ref 70–99)
GLUCOSE BLDC GLUCOMTR-MCNC: 302 MG/DL — HIGH (ref 70–99)
GLUCOSE SERPL-MCNC: 153 MG/DL — HIGH (ref 70–99)
GRAM STN FLD: SIGNIFICANT CHANGE UP
HCT VFR BLD CALC: 27.9 % — LOW (ref 34.5–45)
HGB BLD-MCNC: 9 G/DL — LOW (ref 11.5–15.5)
MCHC RBC-ENTMCNC: 29.8 PG — SIGNIFICANT CHANGE UP (ref 27–34)
MCHC RBC-ENTMCNC: 32.3 GM/DL — SIGNIFICANT CHANGE UP (ref 32–36)
MCV RBC AUTO: 92.3 FL — SIGNIFICANT CHANGE UP (ref 80–100)
PLATELET # BLD AUTO: 203 K/UL — SIGNIFICANT CHANGE UP (ref 150–400)
POTASSIUM SERPL-MCNC: 3.6 MMOL/L — SIGNIFICANT CHANGE UP (ref 3.5–5.3)
POTASSIUM SERPL-SCNC: 3.6 MMOL/L — SIGNIFICANT CHANGE UP (ref 3.5–5.3)
RBC # BLD: 3.02 M/UL — LOW (ref 3.8–5.2)
RBC # FLD: 14.8 % — SIGNIFICANT CHANGE UP (ref 11–15)
SODIUM SERPL-SCNC: 142 MMOL/L — SIGNIFICANT CHANGE UP (ref 135–145)
SPECIMEN SOURCE: SIGNIFICANT CHANGE UP
WBC # BLD: 7.2 K/UL — SIGNIFICANT CHANGE UP (ref 3.8–10.5)
WBC # FLD AUTO: 7.2 K/UL — SIGNIFICANT CHANGE UP (ref 3.8–10.5)

## 2017-11-10 PROCEDURE — 99238 HOSP IP/OBS DSCHRG MGMT 30/<: CPT

## 2017-11-10 PROCEDURE — 78452 HT MUSCLE IMAGE SPECT MULT: CPT | Mod: 26

## 2017-11-10 RX ORDER — AMLODIPINE BESYLATE 2.5 MG/1
1 TABLET ORAL
Qty: 30 | Refills: 0
Start: 2017-11-10 | End: 2017-12-10

## 2017-11-10 RX ORDER — LINAGLIPTIN 5 MG/1
1 TABLET, FILM COATED ORAL
Qty: 30 | Refills: 0
Start: 2017-11-10 | End: 2017-12-10

## 2017-11-10 RX ORDER — SIMVASTATIN 20 MG/1
40 TABLET, FILM COATED ORAL AT BEDTIME
Qty: 0 | Refills: 0 | Status: DISCONTINUED | OUTPATIENT
Start: 2017-11-10 | End: 2017-11-10

## 2017-11-10 RX ORDER — GABAPENTIN 400 MG/1
1 CAPSULE ORAL
Qty: 90 | Refills: 0
Start: 2017-11-10 | End: 2017-12-10

## 2017-11-10 RX ORDER — AMLODIPINE BESYLATE 2.5 MG/1
2 TABLET ORAL
Qty: 0 | Refills: 0 | DISCHARGE
Start: 2017-11-10 | End: 2017-12-10

## 2017-11-10 RX ORDER — PIOGLITAZONE HYDROCHLORIDE 15 MG/1
1 TABLET ORAL
Qty: 0 | Refills: 0 | COMMUNITY

## 2017-11-10 RX ORDER — ATORVASTATIN CALCIUM 80 MG/1
1 TABLET, FILM COATED ORAL
Qty: 30 | Refills: 0
Start: 2017-11-10 | End: 2017-12-10

## 2017-11-10 RX ORDER — ATORVASTATIN CALCIUM 80 MG/1
1 TABLET, FILM COATED ORAL
Qty: 30 | Refills: 0 | OUTPATIENT
Start: 2017-11-10 | End: 2017-12-10

## 2017-11-10 RX ORDER — PENTOXIFYLLINE 400 MG
1 TABLET, EXTENDED RELEASE ORAL
Qty: 0 | Refills: 0 | DISCHARGE
Start: 2017-11-10 | End: 2017-12-10

## 2017-11-10 RX ORDER — LOSARTAN POTASSIUM 100 MG/1
1 TABLET, FILM COATED ORAL
Qty: 0 | Refills: 0 | COMMUNITY

## 2017-11-10 RX ORDER — METOPROLOL TARTRATE 50 MG
1 TABLET ORAL
Qty: 60 | Refills: 0
Start: 2017-11-10 | End: 2017-12-10

## 2017-11-10 RX ORDER — EZETIMIBE 10 MG/1
1 TABLET ORAL
Qty: 0 | Refills: 0 | COMMUNITY

## 2017-11-10 RX ORDER — SIMVASTATIN 20 MG/1
1 TABLET, FILM COATED ORAL
Qty: 0 | Refills: 0 | COMMUNITY

## 2017-11-10 RX ORDER — LOSARTAN POTASSIUM 100 MG/1
1 TABLET, FILM COATED ORAL
Qty: 30 | Refills: 0
Start: 2017-11-10 | End: 2017-12-10

## 2017-11-10 RX ORDER — ASPIRIN/CALCIUM CARB/MAGNESIUM 324 MG
1 TABLET ORAL
Qty: 30 | Refills: 0
Start: 2017-11-10 | End: 2017-12-10

## 2017-11-10 RX ORDER — ATORVASTATIN CALCIUM 80 MG/1
20 TABLET, FILM COATED ORAL AT BEDTIME
Qty: 0 | Refills: 0 | Status: DISCONTINUED | OUTPATIENT
Start: 2017-11-10 | End: 2017-11-10

## 2017-11-10 RX ORDER — AMLODIPINE BESYLATE 2.5 MG/1
1 TABLET ORAL
Qty: 0 | Refills: 0 | COMMUNITY

## 2017-11-10 RX ORDER — LINAGLIPTIN 5 MG/1
1 TABLET, FILM COATED ORAL
Qty: 0 | Refills: 0 | COMMUNITY

## 2017-11-10 RX ORDER — ASPIRIN/CALCIUM CARB/MAGNESIUM 324 MG
1 TABLET ORAL
Qty: 0 | Refills: 0 | COMMUNITY

## 2017-11-10 RX ORDER — PENTOXIFYLLINE 400 MG
1 TABLET, EXTENDED RELEASE ORAL
Qty: 0 | Refills: 0 | COMMUNITY

## 2017-11-10 RX ORDER — GABAPENTIN 400 MG/1
1 CAPSULE ORAL
Qty: 0 | Refills: 0 | COMMUNITY

## 2017-11-10 RX ORDER — PIOGLITAZONE HYDROCHLORIDE 15 MG/1
1 TABLET ORAL
Qty: 30 | Refills: 0
Start: 2017-11-10 | End: 2017-12-10

## 2017-11-10 RX ORDER — PENTOXIFYLLINE 400 MG
1 TABLET, EXTENDED RELEASE ORAL
Qty: 90 | Refills: 0
Start: 2017-11-10 | End: 2017-12-10

## 2017-11-10 RX ORDER — EZETIMIBE 10 MG/1
1 TABLET ORAL
Qty: 30 | Refills: 0
Start: 2017-11-10 | End: 2017-12-10

## 2017-11-10 RX ORDER — CIPROFLOXACIN LACTATE 400MG/40ML
1 VIAL (ML) INTRAVENOUS
Qty: 14 | Refills: 0
Start: 2017-11-10 | End: 2017-11-17

## 2017-11-10 RX ADMIN — LOSARTAN POTASSIUM 100 MILLIGRAM(S): 100 TABLET, FILM COATED ORAL at 06:53

## 2017-11-10 RX ADMIN — Medication 8: at 15:46

## 2017-11-10 RX ADMIN — AMLODIPINE BESYLATE 5 MILLIGRAM(S): 2.5 TABLET ORAL at 06:53

## 2017-11-10 RX ADMIN — REGADENOSON 0.4 MILLIGRAM(S): 0.08 INJECTION, SOLUTION INTRAVENOUS at 12:33

## 2017-11-10 RX ADMIN — Medication 650 MILLIGRAM(S): at 15:18

## 2017-11-10 RX ADMIN — Medication 400 MILLIGRAM(S): at 06:53

## 2017-11-10 RX ADMIN — HEPARIN SODIUM 5000 UNIT(S): 5000 INJECTION INTRAVENOUS; SUBCUTANEOUS at 06:53

## 2017-11-10 RX ADMIN — Medication 500 MILLIGRAM(S): at 17:52

## 2017-11-10 RX ADMIN — INFLUENZA VIRUS VACCINE 0.5 MILLILITER(S): 15; 15; 15; 15 SUSPENSION INTRAMUSCULAR at 15:16

## 2017-11-10 RX ADMIN — Medication 400 MILLIGRAM(S): at 14:56

## 2017-11-10 RX ADMIN — Medication 500 MILLIGRAM(S): at 06:53

## 2017-11-10 RX ADMIN — Medication 81 MILLIGRAM(S): at 14:56

## 2017-11-10 RX ADMIN — Medication 25 MILLIGRAM(S): at 06:53

## 2017-11-10 RX ADMIN — GABAPENTIN 600 MILLIGRAM(S): 400 CAPSULE ORAL at 06:53

## 2017-11-14 DIAGNOSIS — Z79.82 LONG TERM (CURRENT) USE OF ASPIRIN: ICD-10-CM

## 2017-11-14 DIAGNOSIS — N39.0 URINARY TRACT INFECTION, SITE NOT SPECIFIED: ICD-10-CM

## 2017-11-14 DIAGNOSIS — N17.9 ACUTE KIDNEY FAILURE, UNSPECIFIED: ICD-10-CM

## 2017-11-14 DIAGNOSIS — E11.9 TYPE 2 DIABETES MELLITUS WITHOUT COMPLICATIONS: ICD-10-CM

## 2017-11-14 DIAGNOSIS — B96.20 UNSPECIFIED ESCHERICHIA COLI [E. COLI] AS THE CAUSE OF DISEASES CLASSIFIED ELSEWHERE: ICD-10-CM

## 2017-11-14 DIAGNOSIS — A41.51 SEPSIS DUE TO ESCHERICHIA COLI [E. COLI]: ICD-10-CM

## 2017-11-14 DIAGNOSIS — I12.9 HYPERTENSIVE CHRONIC KIDNEY DISEASE WITH STAGE 1 THROUGH STAGE 4 CHRONIC KIDNEY DISEASE, OR UNSPECIFIED CHRONIC KIDNEY DISEASE: ICD-10-CM

## 2017-11-14 DIAGNOSIS — I25.9 CHRONIC ISCHEMIC HEART DISEASE, UNSPECIFIED: ICD-10-CM

## 2017-11-14 DIAGNOSIS — E78.5 HYPERLIPIDEMIA, UNSPECIFIED: ICD-10-CM

## 2017-11-14 DIAGNOSIS — N18.3 CHRONIC KIDNEY DISEASE, STAGE 3 (MODERATE): ICD-10-CM

## 2017-11-14 DIAGNOSIS — I51.7 CARDIOMEGALY: ICD-10-CM

## 2018-12-05 PROBLEM — E11.9 TYPE 2 DIABETES MELLITUS WITHOUT COMPLICATIONS: Chronic | Status: ACTIVE | Noted: 2017-08-01

## 2018-12-05 PROBLEM — I10 ESSENTIAL (PRIMARY) HYPERTENSION: Chronic | Status: ACTIVE | Noted: 2017-08-01

## 2019-01-14 ENCOUNTER — APPOINTMENT (OUTPATIENT)
Dept: NEPHROLOGY | Facility: CLINIC | Age: 84
End: 2019-01-14
Payer: MEDICARE

## 2019-01-14 VITALS
WEIGHT: 166.89 LBS | HEART RATE: 74 BPM | OXYGEN SATURATION: 99 % | SYSTOLIC BLOOD PRESSURE: 153 MMHG | DIASTOLIC BLOOD PRESSURE: 58 MMHG | HEIGHT: 65 IN | BODY MASS INDEX: 27.81 KG/M2

## 2019-01-14 DIAGNOSIS — Z83.3 FAMILY HISTORY OF DIABETES MELLITUS: ICD-10-CM

## 2019-01-14 DIAGNOSIS — Z78.9 OTHER SPECIFIED HEALTH STATUS: ICD-10-CM

## 2019-01-14 PROCEDURE — 99204 OFFICE O/P NEW MOD 45 MIN: CPT

## 2019-01-14 NOTE — ASSESSMENT
[FreeTextEntry1] : Elevated creatinine: Will repeat blood chemistry including phosphorus, uric acid, A1C, and Hepatitis B and C panel.\par Will get repeat urinalysis and urine protein/creatinine.\par Reviewed renal sonogram\par CKD risk factors- DM, Hyperlipidemia, Hypertension, Hyperuricemia.\par Discussed current level of renal function and its implications.\par Discussed renal function preservation strategies including: Sleep hygiene, avoiding NSAIDs and herbal medications, avoiding excessive animal protein and sodium in diet, maintaining optimized weight, blood pressure, glucose and lipids.\par F/u in 6 months if labs are stable\par \par

## 2019-01-14 NOTE — HISTORY OF PRESENT ILLNESS
[FreeTextEntry1] : 83 years old male, born in [facility], living in [place] from 1969\par Patient has known CKD (2015), on follow up with Dr. Michelle Serrano is here for nephrology consultation\par She is accompanied by her daughter, Homero Villalobos, who is an RN at [employer] today.\par She has known DM (age 40) Was on insulin not on now; HTN (age 55). H/o Hyperlipidemia (55). No  Gout\par No known h/o kidney stone.\par No hematuria/Transfusions\par Urine out put:normal\par Nocturia:2-3 times\par Has no h/o Pneumonia. Had  UTI and bacteremia and was admitted to [facility] in 2018.\par No known h/o active CAD/CVA/DVT/neoplasia/active infections/bleeding. H/o PVD. Was told to have small vessel disease from DM, was seen by Vascular at [employer]. Was on pletal, could not tolerate it - had palpitations.\par Reports no major allergies. Does not take any blood thinners. No known h/o tuberculosis or hepatitis.\par Most recent hospitalization/for: [facility] in 2018\par Past surgeries:\par Appendectomy.\par No history of kidney/ bladder surgery.\par Non smoker.\par Fam: Parents are . Father -  at 39 unknown cause of death. Mother- 103 years, DM Siblings-2 brothers and 1 sisters alive and well. One brother has DM.\par Children: Three living children, healthy. Fifteen grandchildren. Four great grand children.\par No family history of kidney disease.\par Independent for ADL\par Able to walk one blocks, can climb stairs with difficulty.\par ROS: Has h/o shortness of breath on exertion. No h/o Sleep apnea. No h/o Thyroid disease. H/o thyroid biopsy for goiter many years ago.\par Functional/employment status: Retired from [employer] at age 52. \par \par Current Meds:\par aspirin 81 mg oral tablet \par losartan 100 mg oral tablet /day\par gabapentin 600 mg oral tablet /day\par glipiZIDE 5 mg oral tablet /day\par pioglitazone 30 mg oral tablet/day \par Januvia 5o mg oral tablet /day\par Zetia 10 mg oral tablet /day\par amLODIPine 5 mg oral tablet /day\par pentoxifylline 400 mg oral tablet/day\par \par reviewed labs from 2018, imaging and serum electrophoresis (neg). Last Cr 1.9 mg/dl in 2018

## 2019-01-14 NOTE — PHYSICAL EXAM
[General Appearance - Alert] : alert [General Appearance - In No Acute Distress] : in no acute distress [Sclera] : the sclera and conjunctiva were normal [PERRL With Normal Accommodation] : pupils were equal in size, round, and reactive to light [Extraocular Movements] : extraocular movements were intact [Outer Ear] : the ears and nose were normal in appearance [Oropharynx] : the oropharynx was normal [Neck Appearance] : the appearance of the neck was normal [Neck Cervical Mass (___cm)] : no neck mass was observed [Jugular Venous Distention Increased] : there was no jugular-venous distention [Thyroid Diffuse Enlargement] : the thyroid was not enlarged [Thyroid Nodule] : there were no palpable thyroid nodules [Auscultation Breath Sounds / Voice Sounds] : lungs were clear to auscultation bilaterally [Heart Rate And Rhythm] : heart rate was normal and rhythm regular [Heart Sounds] : normal S1 and S2 [Heart Sounds Gallop] : no gallops [Heart Sounds Pericardial Friction Rub] : no pericardial rub [Edema] : there was no peripheral edema [Bowel Sounds] : normal bowel sounds [Abdomen Soft] : soft [Abdomen Tenderness] : non-tender [Abdomen Mass (___ Cm)] : no abdominal mass palpated [Cervical Lymph Nodes Enlarged Posterior Bilaterally] : posterior cervical [Cervical Lymph Nodes Enlarged Anterior Bilaterally] : anterior cervical [Supraclavicular Lymph Nodes Enlarged Bilaterally] : supraclavicular [Axillary Lymph Nodes Enlarged Bilaterally] : axillary [Femoral Lymph Nodes Enlarged Bilaterally] : femoral [Inguinal Lymph Nodes Enlarged Bilaterally] : inguinal [Involuntary Movements] : no involuntary movements were seen [] : no rash [No Focal Deficits] : no focal deficits [Oriented To Time, Place, And Person] : oriented to person, place, and time [Impaired Insight] : insight and judgment were intact [Affect] : the affect was normal

## 2019-05-21 LAB
25(OH)D3 SERPL-MCNC: 46 NG/ML
ALBUMIN SERPL ELPH-MCNC: 4.6 G/DL
ALP BLD-CCNC: 52 U/L
ALT SERPL-CCNC: 20 U/L
ANION GAP SERPL CALC-SCNC: 15 MMOL/L
APPEARANCE: CLEAR
AST SERPL-CCNC: 27 U/L
BACTERIA: NEGATIVE
BASOPHILS # BLD AUTO: 0.01 K/UL
BASOPHILS NFR BLD AUTO: 0.2 %
BILIRUB SERPL-MCNC: 0.2 MG/DL
BILIRUBIN URINE: NEGATIVE
BLOOD URINE: NEGATIVE
BUN SERPL-MCNC: 43 MG/DL
CALCIUM SERPL-MCNC: 10.1 MG/DL
CALCIUM SERPL-MCNC: 10.1 MG/DL
CHLORIDE SERPL-SCNC: 102 MMOL/L
CHOLEST SERPL-MCNC: 154 MG/DL
CHOLEST/HDLC SERPL: 2.3 RATIO
CO2 SERPL-SCNC: 24 MMOL/L
COLOR: YELLOW
CREAT SERPL-MCNC: 1.91 MG/DL
CREAT SPEC-SCNC: 163 MG/DL
EOSINOPHIL # BLD AUTO: 0.07 K/UL
EOSINOPHIL NFR BLD AUTO: 1.1 %
GLUCOSE QUALITATIVE U: NEGATIVE MG/DL
GLUCOSE SERPL-MCNC: 180 MG/DL
HBA1C MFR BLD HPLC: 6.5 %
HBV SURFACE AG SER QL: NONREACTIVE
HCT VFR BLD CALC: 31.8 %
HCV AB SER QL: NONREACTIVE
HCV S/CO RATIO: 0.12 S/CO
HDLC SERPL-MCNC: 68 MG/DL
HGB BLD-MCNC: 9.9 G/DL
HYALINE CASTS: 5 /LPF
IMM GRANULOCYTES NFR BLD AUTO: 0.3 %
KETONES URINE: ABNORMAL
LDLC SERPL CALC-MCNC: 73 MG/DL
LEUKOCYTE ESTERASE URINE: NEGATIVE
LYMPHOCYTES # BLD AUTO: 1.24 K/UL
LYMPHOCYTES NFR BLD AUTO: 19.5 %
MAGNESIUM SERPL-MCNC: 2.1 MG/DL
MAN DIFF?: NORMAL
MCHC RBC-ENTMCNC: 29.8 PG
MCHC RBC-ENTMCNC: 31.1 GM/DL
MCV RBC AUTO: 95.8 FL
MICROALBUMIN 24H UR DL<=1MG/L-MCNC: 6.4 MG/DL
MICROALBUMIN/CREAT 24H UR-RTO: 39 MG/G
MICROSCOPIC-UA: NORMAL
MONOCYTES # BLD AUTO: 0.45 K/UL
MONOCYTES NFR BLD AUTO: 7.1 %
NEUTROPHILS # BLD AUTO: 4.57 K/UL
NEUTROPHILS NFR BLD AUTO: 71.8 %
NITRITE URINE: NEGATIVE
PARATHYROID HORMONE INTACT: 57 PG/ML
PH URINE: 5
PHOSPHATE SERPL-MCNC: 4.3 MG/DL
PLATELET # BLD AUTO: 205 K/UL
POTASSIUM SERPL-SCNC: 5.1 MMOL/L
PROT SERPL-MCNC: 7.2 G/DL
PROTEIN URINE: NEGATIVE MG/DL
RBC # BLD: 3.32 M/UL
RBC # FLD: 16.8 %
RED BLOOD CELLS URINE: 2 /HPF
SODIUM SERPL-SCNC: 141 MMOL/L
SPECIFIC GRAVITY URINE: 1.02
SQUAMOUS EPITHELIAL CELLS: 1 /HPF
TRIGL SERPL-MCNC: 66 MG/DL
URATE SERPL-MCNC: 5.4 MG/DL
UROBILINOGEN URINE: NEGATIVE MG/DL
WBC # FLD AUTO: 6.36 K/UL
WHITE BLOOD CELLS URINE: 2 /HPF

## 2019-11-19 ENCOUNTER — APPOINTMENT (OUTPATIENT)
Dept: VASCULAR SURGERY | Facility: CLINIC | Age: 84
End: 2019-11-19

## 2021-02-08 ENCOUNTER — APPOINTMENT (OUTPATIENT)
Dept: NEPHROLOGY | Facility: CLINIC | Age: 86
End: 2021-02-08
Payer: MEDICARE

## 2021-02-08 DIAGNOSIS — N18.9 CHRONIC KIDNEY DISEASE, UNSPECIFIED: ICD-10-CM

## 2021-02-08 DIAGNOSIS — E78.5 HYPERLIPIDEMIA, UNSPECIFIED: ICD-10-CM

## 2021-02-08 DIAGNOSIS — I10 ESSENTIAL (PRIMARY) HYPERTENSION: ICD-10-CM

## 2021-02-08 DIAGNOSIS — E11.29 TYPE 2 DIABETES MELLITUS WITH OTHER DIABETIC KIDNEY COMPLICATION: ICD-10-CM

## 2021-02-08 DIAGNOSIS — Z79.4 TYPE 2 DIABETES MELLITUS WITH OTHER DIABETIC KIDNEY COMPLICATION: ICD-10-CM

## 2021-02-08 PROCEDURE — 99072 ADDL SUPL MATRL&STAF TM PHE: CPT

## 2021-02-08 PROCEDURE — 99214 OFFICE O/P EST MOD 30 MIN: CPT

## 2021-02-08 NOTE — ASSESSMENT
[FreeTextEntry1] : Elevated creatinine: chronic kidney disease. Will repeat blood chemistry including phosphorus.\par Will get repeat urinalysis and urine protein/creatinine.\par CKD risk factors- DM, Hyperlipidemia, Hypertension, Hyperuricemia.\par Discussed current level of renal function and its implications.\par Discussed again renal function preservation strategies including: Sleep hygiene, avoiding NSAIDs and herbal medications, avoiding excessive animal protein and sodium in diet, maintaining optimized weight, blood pressure, glucose and lipids.\par Patient will have labs done at a nearby urgent care center\par F/u in 6 months if labs are stable\par \par

## 2021-02-08 NOTE — HISTORY OF PRESENT ILLNESS
[Home] : at home, [unfilled] , at the time of the visit. [Medical Office: ([facility])___] : at the medical office located in  [Verbal consent obtained from patient] : the patient, [unfilled] [FreeTextEntry1] : This visit is provided by telehealth using real time 2 way audio  technology. This patient is located at home and the provider is located at the [facility] medical office at [address] [place] [address]. Patient  participated in this visit.\par Verbal consent for this visit was given by patient\par Total duration of this visit which included conversation, lab review, medication review and clinical assessment and advice lasted approximately 25-30 minutes.\par \par \par Home blood pressure 130/90 weight 145 lbs. Took one dose of COVID vaccine already.\par \par \par Currently:\par \par Patient feels well\par Reviewed for acute symptoms-currently has none.\par Taking precautions to prevent COVID exposure\par I reviewed current home  blood pressure and medications.\par On Telehealth visit:\par Patient appears comfortable\par No distress, not dyspneic\par Conscious, alert, oriented X 3\par Speech: Normal\par Other observations as noted\par \par Reviewed last lab data \par

## 2021-04-05 ENCOUNTER — OUTPATIENT (OUTPATIENT)
Dept: OUTPATIENT SERVICES | Facility: HOSPITAL | Age: 86
LOS: 1 days | Discharge: HOME | End: 2021-04-05
Payer: MEDICARE

## 2021-04-05 PROCEDURE — 90839 PSYTX CRISIS INITIAL 60 MIN: CPT

## 2021-04-06 DIAGNOSIS — I10 ESSENTIAL (PRIMARY) HYPERTENSION: ICD-10-CM

## 2021-04-06 DIAGNOSIS — N18.4 CHRONIC KIDNEY DISEASE, STAGE 4 (SEVERE): ICD-10-CM

## 2021-04-06 DIAGNOSIS — F22 DELUSIONAL DISORDERS: ICD-10-CM

## 2021-04-06 DIAGNOSIS — E11.9 TYPE 2 DIABETES MELLITUS WITHOUT COMPLICATIONS: ICD-10-CM

## 2021-09-15 NOTE — ED ADULT NURSE NOTE - NS ED NURSE RECORD ANOTHER VITAL SIGN
Department of Anesthesiology  Postprocedure Note    Patient: Larry Sherman  MRN: [id number]  YOB: 1967  Date of evaluation: 9/15/2021  Time:  1:38 PM     Procedure Summary     Date: 09/15/21 Room / Location: [address]    Anesthesia Start: 2573 Anesthesia Stop: 1672    Procedure: I&D INDEX FINGER (Right ) Diagnosis: (CELLULITIS)    Surgeons: Jack Keyes MD Responsible Provider: Colby Aguilar MD    Anesthesia Type: general ASA Status: 3          Anesthesia Type: general    Elin Phase I: Elin Score: 10    Elin Phase II:      Last vitals: Reviewed and per EMR flowsheets.        Anesthesia Post Evaluation    Patient location during evaluation: PACU  Patient participation: complete - patient participated  Level of consciousness: awake and alert  Airway patency: patent  Nausea & Vomiting: no nausea and no vomiting  Complications: no  Cardiovascular status: blood pressure returned to baseline  Respiratory status: acceptable  Hydration status: euvolemic
Yes

## 2022-08-23 ENCOUNTER — APPOINTMENT (OUTPATIENT)
Dept: ULTRASOUND IMAGING | Facility: IMAGING CENTER | Age: 87
End: 2022-08-23

## 2022-08-23 ENCOUNTER — OUTPATIENT (OUTPATIENT)
Dept: OUTPATIENT SERVICES | Facility: HOSPITAL | Age: 87
LOS: 1 days | End: 2022-08-23
Payer: COMMERCIAL

## 2022-08-23 ENCOUNTER — RESULT REVIEW (OUTPATIENT)
Age: 87
End: 2022-08-23

## 2022-08-23 DIAGNOSIS — Z00.8 ENCOUNTER FOR OTHER GENERAL EXAMINATION: ICD-10-CM

## 2022-08-23 PROCEDURE — 10005 FNA BX W/US GDN 1ST LES: CPT

## 2022-08-23 PROCEDURE — 88173 CYTOPATH EVAL FNA REPORT: CPT | Mod: 26

## 2022-08-23 PROCEDURE — 88172 CYTP DX EVAL FNA 1ST EA SITE: CPT

## 2022-08-23 PROCEDURE — 88173 CYTOPATH EVAL FNA REPORT: CPT

## 2022-11-17 ENCOUNTER — INPATIENT (INPATIENT)
Facility: HOSPITAL | Age: 87
LOS: 4 days | Discharge: HOME HEALTH | End: 2022-11-22
Attending: HOSPITALIST | Admitting: HOSPITALIST

## 2022-11-17 VITALS
SYSTOLIC BLOOD PRESSURE: 124 MMHG | DIASTOLIC BLOOD PRESSURE: 36 MMHG | HEART RATE: 77 BPM | TEMPERATURE: 102 F | RESPIRATION RATE: 20 BRPM | OXYGEN SATURATION: 100 %

## 2022-11-17 PROCEDURE — 99284 EMERGENCY DEPT VISIT MOD MDM: CPT

## 2022-11-17 NOTE — ED ADULT TRIAGE NOTE - CHIEF COMPLAINT QUOTE
Fever 102.5 since this evening.  Pt is c/o generalized weakness and she fell twice at home. Denies LOC or head injury. she took Tylenol 650 mg at 6pm.Had completed Nitrofurantoin for UTI three days ago. c/o abdominal pain since Yesterday. PMH of DM, HTN, Hyoerlipidemia, CKD

## 2022-11-18 DIAGNOSIS — N39.0 URINARY TRACT INFECTION, SITE NOT SPECIFIED: ICD-10-CM

## 2022-11-18 DIAGNOSIS — I10 ESSENTIAL (PRIMARY) HYPERTENSION: ICD-10-CM

## 2022-11-18 DIAGNOSIS — N18.9 CHRONIC KIDNEY DISEASE, UNSPECIFIED: ICD-10-CM

## 2022-11-18 DIAGNOSIS — H40.9 UNSPECIFIED GLAUCOMA: ICD-10-CM

## 2022-11-18 DIAGNOSIS — E11.9 TYPE 2 DIABETES MELLITUS WITHOUT COMPLICATIONS: ICD-10-CM
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
## 2022-11-18 DIAGNOSIS — I73.9 PERIPHERAL VASCULAR DISEASE, UNSPECIFIED: ICD-10-CM

## 2022-11-18 DIAGNOSIS — Z90.49 ACQUIRED ABSENCE OF OTHER SPECIFIED PARTS OF DIGESTIVE TRACT: Chronic | ICD-10-CM

## 2022-11-18 DIAGNOSIS — Z29.9 ENCOUNTER FOR PROPHYLACTIC MEASURES, UNSPECIFIED: ICD-10-CM

## 2022-11-18 LAB
A1C WITH ESTIMATED AVERAGE GLUCOSE RESULT: 7.3 % — HIGH (ref 4–5.6)
ALBUMIN SERPL ELPH-MCNC: 4.3 G/DL — SIGNIFICANT CHANGE UP (ref 3.3–5)
ALP SERPL-CCNC: 65 U/L — SIGNIFICANT CHANGE UP (ref 40–120)
ALT FLD-CCNC: 21 U/L — SIGNIFICANT CHANGE UP (ref 4–33)
ANION GAP SERPL CALC-SCNC: 11 MMOL/L — SIGNIFICANT CHANGE UP (ref 7–14)
ANION GAP SERPL CALC-SCNC: 16 MMOL/L — HIGH (ref 7–14)
APPEARANCE UR: CLEAR — SIGNIFICANT CHANGE UP
AST SERPL-CCNC: 29 U/L — SIGNIFICANT CHANGE UP (ref 4–32)
B PERT DNA SPEC QL NAA+PROBE: SIGNIFICANT CHANGE UP
B PERT+PARAPERT DNA PNL SPEC NAA+PROBE: SIGNIFICANT CHANGE UP
BASE EXCESS BLDV CALC-SCNC: -4.4 MMOL/L — LOW (ref -2–3)
BASOPHILS # BLD AUTO: 0.01 K/UL — SIGNIFICANT CHANGE UP (ref 0–0.2)
BASOPHILS # BLD AUTO: 0.02 K/UL — SIGNIFICANT CHANGE UP (ref 0–0.2)
BASOPHILS NFR BLD AUTO: 0.1 % — SIGNIFICANT CHANGE UP (ref 0–2)
BASOPHILS NFR BLD AUTO: 0.2 % — SIGNIFICANT CHANGE UP (ref 0–2)
BILIRUB SERPL-MCNC: 0.4 MG/DL — SIGNIFICANT CHANGE UP (ref 0.2–1.2)
BILIRUB UR-MCNC: NEGATIVE — SIGNIFICANT CHANGE UP
BLOOD GAS VENOUS COMPREHENSIVE RESULT: SIGNIFICANT CHANGE UP
BORDETELLA PARAPERTUSSIS (RAPRVP): SIGNIFICANT CHANGE UP
BUN SERPL-MCNC: 38 MG/DL — HIGH (ref 7–23)
BUN SERPL-MCNC: 47 MG/DL — HIGH (ref 7–23)
C PNEUM DNA SPEC QL NAA+PROBE: SIGNIFICANT CHANGE UP
CALCIUM SERPL-MCNC: 9.4 MG/DL — SIGNIFICANT CHANGE UP (ref 8.4–10.5)
CALCIUM SERPL-MCNC: 9.9 MG/DL — SIGNIFICANT CHANGE UP (ref 8.4–10.5)
CHLORIDE BLDV-SCNC: 100 MMOL/L — SIGNIFICANT CHANGE UP (ref 96–108)
CHLORIDE SERPL-SCNC: 108 MMOL/L — HIGH (ref 98–107)
CHLORIDE SERPL-SCNC: 99 MMOL/L — SIGNIFICANT CHANGE UP (ref 98–107)
CHOLEST SERPL-MCNC: 172 MG/DL — SIGNIFICANT CHANGE UP
CO2 BLDV-SCNC: 22.9 MMOL/L — SIGNIFICANT CHANGE UP (ref 22–26)
CO2 SERPL-SCNC: 17 MMOL/L — LOW (ref 22–31)
CO2 SERPL-SCNC: 21 MMOL/L — LOW (ref 22–31)
COLOR SPEC: YELLOW — SIGNIFICANT CHANGE UP
CREAT SERPL-MCNC: 1.6 MG/DL — HIGH (ref 0.5–1.3)
CREAT SERPL-MCNC: 2.04 MG/DL — HIGH (ref 0.5–1.3)
DIFF PNL FLD: NEGATIVE — SIGNIFICANT CHANGE UP
EGFR: 23 ML/MIN/1.73M2 — LOW
EGFR: 31 ML/MIN/1.73M2 — LOW
EOSINOPHIL # BLD AUTO: 0 K/UL — SIGNIFICANT CHANGE UP (ref 0–0.5)
EOSINOPHIL # BLD AUTO: 0.03 K/UL — SIGNIFICANT CHANGE UP (ref 0–0.5)
EOSINOPHIL NFR BLD AUTO: 0 % — SIGNIFICANT CHANGE UP (ref 0–6)
EOSINOPHIL NFR BLD AUTO: 0.3 % — SIGNIFICANT CHANGE UP (ref 0–6)
ESTIMATED AVERAGE GLUCOSE: 163 — SIGNIFICANT CHANGE UP
FLUAV SUBTYP SPEC NAA+PROBE: SIGNIFICANT CHANGE UP
FLUBV RNA SPEC QL NAA+PROBE: SIGNIFICANT CHANGE UP
GAS PNL BLDV: 129 MMOL/L — LOW (ref 136–145)
GLUCOSE BLDC GLUCOMTR-MCNC: 114 MG/DL — HIGH (ref 70–99)
GLUCOSE BLDC GLUCOMTR-MCNC: 118 MG/DL — HIGH (ref 70–99)
GLUCOSE BLDC GLUCOMTR-MCNC: 161 MG/DL — HIGH (ref 70–99)
GLUCOSE BLDC GLUCOMTR-MCNC: 326 MG/DL — HIGH (ref 70–99)
GLUCOSE BLDV-MCNC: 213 MG/DL — HIGH (ref 70–99)
GLUCOSE SERPL-MCNC: 122 MG/DL — HIGH (ref 70–99)
GLUCOSE SERPL-MCNC: 204 MG/DL — HIGH (ref 70–99)
GLUCOSE UR QL: NEGATIVE — SIGNIFICANT CHANGE UP
HADV DNA SPEC QL NAA+PROBE: SIGNIFICANT CHANGE UP
HCO3 BLDV-SCNC: 22 MMOL/L — SIGNIFICANT CHANGE UP (ref 22–29)
HCOV 229E RNA SPEC QL NAA+PROBE: SIGNIFICANT CHANGE UP
HCOV HKU1 RNA SPEC QL NAA+PROBE: SIGNIFICANT CHANGE UP
HCOV NL63 RNA SPEC QL NAA+PROBE: SIGNIFICANT CHANGE UP
HCOV OC43 RNA SPEC QL NAA+PROBE: SIGNIFICANT CHANGE UP
HCT VFR BLD CALC: 33.1 % — LOW (ref 34.5–45)
HCT VFR BLD CALC: 33.9 % — LOW (ref 34.5–45)
HCT VFR BLDA CALC: 51 % — HIGH (ref 34.5–46.5)
HDLC SERPL-MCNC: 56 MG/DL — SIGNIFICANT CHANGE UP
HGB BLD CALC-MCNC: 17.1 G/DL — HIGH (ref 11.5–15.5)
HGB BLD-MCNC: 10.8 G/DL — LOW (ref 11.5–15.5)
HGB BLD-MCNC: 11 G/DL — LOW (ref 11.5–15.5)
HMPV RNA SPEC QL NAA+PROBE: SIGNIFICANT CHANGE UP
HPIV1 RNA SPEC QL NAA+PROBE: SIGNIFICANT CHANGE UP
HPIV2 RNA SPEC QL NAA+PROBE: SIGNIFICANT CHANGE UP
HPIV3 RNA SPEC QL NAA+PROBE: SIGNIFICANT CHANGE UP
HPIV4 RNA SPEC QL NAA+PROBE: SIGNIFICANT CHANGE UP
IANC: 10.15 K/UL — HIGH (ref 1.8–7.4)
IANC: 8.73 K/UL — HIGH (ref 1.8–7.4)
IMM GRANULOCYTES NFR BLD AUTO: 0.3 % — SIGNIFICANT CHANGE UP (ref 0–0.9)
IMM GRANULOCYTES NFR BLD AUTO: 0.4 % — SIGNIFICANT CHANGE UP (ref 0–0.9)
KETONES UR-MCNC: NEGATIVE — SIGNIFICANT CHANGE UP
LACTATE BLDV-MCNC: 1.7 MMOL/L — SIGNIFICANT CHANGE UP (ref 0.5–2)
LEUKOCYTE ESTERASE UR-ACNC: NEGATIVE — SIGNIFICANT CHANGE UP
LIDOCAIN IGE QN: 57 U/L — SIGNIFICANT CHANGE UP (ref 7–60)
LIPID PNL WITH DIRECT LDL SERPL: 102 MG/DL — HIGH
LYMPHOCYTES # BLD AUTO: 0.97 K/UL — LOW (ref 1–3.3)
LYMPHOCYTES # BLD AUTO: 1.19 K/UL — SIGNIFICANT CHANGE UP (ref 1–3.3)
LYMPHOCYTES # BLD AUTO: 11.3 % — LOW (ref 13–44)
LYMPHOCYTES # BLD AUTO: 8.4 % — LOW (ref 13–44)
M PNEUMO DNA SPEC QL NAA+PROBE: SIGNIFICANT CHANGE UP
MAGNESIUM SERPL-MCNC: 2.2 MG/DL — SIGNIFICANT CHANGE UP (ref 1.6–2.6)
MCHC RBC-ENTMCNC: 30.3 PG — SIGNIFICANT CHANGE UP (ref 27–34)
MCHC RBC-ENTMCNC: 30.4 PG — SIGNIFICANT CHANGE UP (ref 27–34)
MCHC RBC-ENTMCNC: 32.4 GM/DL — SIGNIFICANT CHANGE UP (ref 32–36)
MCHC RBC-ENTMCNC: 32.6 GM/DL — SIGNIFICANT CHANGE UP (ref 32–36)
MCV RBC AUTO: 93.2 FL — SIGNIFICANT CHANGE UP (ref 80–100)
MCV RBC AUTO: 93.4 FL — SIGNIFICANT CHANGE UP (ref 80–100)
MONOCYTES # BLD AUTO: 0.39 K/UL — SIGNIFICANT CHANGE UP (ref 0–0.9)
MONOCYTES # BLD AUTO: 0.53 K/UL — SIGNIFICANT CHANGE UP (ref 0–0.9)
MONOCYTES NFR BLD AUTO: 3.4 % — SIGNIFICANT CHANGE UP (ref 2–14)
MONOCYTES NFR BLD AUTO: 5 % — SIGNIFICANT CHANGE UP (ref 2–14)
NEUTROPHILS # BLD AUTO: 10.15 K/UL — HIGH (ref 1.8–7.4)
NEUTROPHILS # BLD AUTO: 8.73 K/UL — HIGH (ref 1.8–7.4)
NEUTROPHILS NFR BLD AUTO: 82.8 % — HIGH (ref 43–77)
NEUTROPHILS NFR BLD AUTO: 87.8 % — HIGH (ref 43–77)
NITRITE UR-MCNC: NEGATIVE — SIGNIFICANT CHANGE UP
NON HDL CHOLESTEROL: 116 MG/DL — SIGNIFICANT CHANGE UP
NRBC # BLD: 0 /100 WBCS — SIGNIFICANT CHANGE UP (ref 0–0)
NRBC # BLD: 0 /100 WBCS — SIGNIFICANT CHANGE UP (ref 0–0)
NRBC # FLD: 0 K/UL — SIGNIFICANT CHANGE UP (ref 0–0)
NRBC # FLD: 0 K/UL — SIGNIFICANT CHANGE UP (ref 0–0)
PCO2 BLDV: 42 MMHG — SIGNIFICANT CHANGE UP (ref 39–42)
PH BLDV: 7.32 — SIGNIFICANT CHANGE UP (ref 7.32–7.43)
PH UR: 6 — SIGNIFICANT CHANGE UP (ref 5–8)
PHOSPHATE SERPL-MCNC: 3.2 MG/DL — SIGNIFICANT CHANGE UP (ref 2.5–4.5)
PLATELET # BLD AUTO: 192 K/UL — SIGNIFICANT CHANGE UP (ref 150–400)
PLATELET # BLD AUTO: 194 K/UL — SIGNIFICANT CHANGE UP (ref 150–400)
PO2 BLDV: 31 MMHG — SIGNIFICANT CHANGE UP
POTASSIUM BLDV-SCNC: 4 MMOL/L — SIGNIFICANT CHANGE UP (ref 3.5–5.1)
POTASSIUM SERPL-MCNC: 3.8 MMOL/L — SIGNIFICANT CHANGE UP (ref 3.5–5.3)
POTASSIUM SERPL-MCNC: 4.6 MMOL/L — SIGNIFICANT CHANGE UP (ref 3.5–5.3)
POTASSIUM SERPL-SCNC: 3.8 MMOL/L — SIGNIFICANT CHANGE UP (ref 3.5–5.3)
POTASSIUM SERPL-SCNC: 4.6 MMOL/L — SIGNIFICANT CHANGE UP (ref 3.5–5.3)
PROT SERPL-MCNC: 7.4 G/DL — SIGNIFICANT CHANGE UP (ref 6–8.3)
PROT UR-MCNC: ABNORMAL
RAPID RVP RESULT: SIGNIFICANT CHANGE UP
RBC # BLD: 3.55 M/UL — LOW (ref 3.8–5.2)
RBC # BLD: 3.63 M/UL — LOW (ref 3.8–5.2)
RBC # FLD: 15.4 % — HIGH (ref 10.3–14.5)
RBC # FLD: 15.5 % — HIGH (ref 10.3–14.5)
RSV RNA SPEC QL NAA+PROBE: SIGNIFICANT CHANGE UP
RV+EV RNA SPEC QL NAA+PROBE: SIGNIFICANT CHANGE UP
SAO2 % BLDV: 58.6 % — SIGNIFICANT CHANGE UP
SARS-COV-2 RNA SPEC QL NAA+PROBE: SIGNIFICANT CHANGE UP
SODIUM SERPL-SCNC: 132 MMOL/L — LOW (ref 135–145)
SODIUM SERPL-SCNC: 140 MMOL/L — SIGNIFICANT CHANGE UP (ref 135–145)
SP GR SPEC: 1.02 — SIGNIFICANT CHANGE UP (ref 1.01–1.05)
TRIGL SERPL-MCNC: 68 MG/DL — SIGNIFICANT CHANGE UP
TSH SERPL-MCNC: 0.65 UIU/ML — SIGNIFICANT CHANGE UP (ref 0.27–4.2)
TSH SERPL-MCNC: 0.8 UIU/ML — SIGNIFICANT CHANGE UP (ref 0.27–4.2)
UROBILINOGEN FLD QL: SIGNIFICANT CHANGE UP
WBC # BLD: 10.54 K/UL — HIGH (ref 3.8–10.5)
WBC # BLD: 11.55 K/UL — HIGH (ref 3.8–10.5)
WBC # FLD AUTO: 10.54 K/UL — HIGH (ref 3.8–10.5)
WBC # FLD AUTO: 11.55 K/UL — HIGH (ref 3.8–10.5)

## 2022-11-18 PROCEDURE — 76770 US EXAM ABDO BACK WALL COMP: CPT | Mod: 26

## 2022-11-18 PROCEDURE — 70450 CT HEAD/BRAIN W/O DYE: CPT | Mod: 26,MA

## 2022-11-18 PROCEDURE — 71045 X-RAY EXAM CHEST 1 VIEW: CPT | Mod: 26

## 2022-11-18 PROCEDURE — 93306 TTE W/DOPPLER COMPLETE: CPT | Mod: 26

## 2022-11-18 PROCEDURE — 99223 1ST HOSP IP/OBS HIGH 75: CPT

## 2022-11-18 PROCEDURE — 74176 CT ABD & PELVIS W/O CONTRAST: CPT | Mod: 26,MA

## 2022-11-18 RX ORDER — HEPARIN SODIUM 5000 [USP'U]/ML
5000 INJECTION INTRAVENOUS; SUBCUTANEOUS EVERY 8 HOURS
Refills: 0 | Status: DISCONTINUED | OUTPATIENT
Start: 2022-11-18 | End: 2022-11-22

## 2022-11-18 RX ORDER — GLUCAGON INJECTION, SOLUTION 0.5 MG/.1ML
1 INJECTION, SOLUTION SUBCUTANEOUS ONCE
Refills: 0 | Status: DISCONTINUED | OUTPATIENT
Start: 2022-11-18 | End: 2022-11-22

## 2022-11-18 RX ORDER — INSULIN LISPRO 100/ML
VIAL (ML) SUBCUTANEOUS AT BEDTIME
Refills: 0 | Status: DISCONTINUED | OUTPATIENT
Start: 2022-11-18 | End: 2022-11-22

## 2022-11-18 RX ORDER — PILOCARPINE HCL 4 %
1 DROPS OPHTHALMIC (EYE)
Refills: 0 | Status: DISCONTINUED | OUTPATIENT
Start: 2022-11-18 | End: 2022-11-22

## 2022-11-18 RX ORDER — CEFTRIAXONE 500 MG/1
1000 INJECTION, POWDER, FOR SOLUTION INTRAMUSCULAR; INTRAVENOUS EVERY 24 HOURS
Refills: 0 | Status: DISCONTINUED | OUTPATIENT
Start: 2022-11-19 | End: 2022-11-20

## 2022-11-18 RX ORDER — DORZOLAMIDE HYDROCHLORIDE 20 MG/ML
1 SOLUTION/ DROPS OPHTHALMIC THREE TIMES A DAY
Refills: 0 | Status: DISCONTINUED | OUTPATIENT
Start: 2022-11-18 | End: 2022-11-22

## 2022-11-18 RX ORDER — DORZOLAMIDE HYDROCHLORIDE 20 MG/ML
1 SOLUTION/ DROPS OPHTHALMIC
Qty: 0 | Refills: 0 | DISCHARGE

## 2022-11-18 RX ORDER — INSULIN DETEMIR 100/ML (3)
20 INSULIN PEN (ML) SUBCUTANEOUS AT BEDTIME
Refills: 0 | Status: DISCONTINUED | OUTPATIENT
Start: 2022-11-18 | End: 2022-11-19

## 2022-11-18 RX ORDER — DEXTROSE 50 % IN WATER 50 %
25 SYRINGE (ML) INTRAVENOUS ONCE
Refills: 0 | Status: DISCONTINUED | OUTPATIENT
Start: 2022-11-18 | End: 2022-11-22

## 2022-11-18 RX ORDER — DEXTROSE 50 % IN WATER 50 %
15 SYRINGE (ML) INTRAVENOUS ONCE
Refills: 0 | Status: DISCONTINUED | OUTPATIENT
Start: 2022-11-18 | End: 2022-11-22

## 2022-11-18 RX ORDER — METOPROLOL TARTRATE 50 MG
1 TABLET ORAL
Qty: 0 | Refills: 0 | DISCHARGE

## 2022-11-18 RX ORDER — SODIUM CHLORIDE 9 MG/ML
1000 INJECTION, SOLUTION INTRAVENOUS
Refills: 0 | Status: DISCONTINUED | OUTPATIENT
Start: 2022-11-18 | End: 2022-11-22

## 2022-11-18 RX ORDER — ACETAMINOPHEN 500 MG
1000 TABLET ORAL ONCE
Refills: 0 | Status: COMPLETED | OUTPATIENT
Start: 2022-11-18 | End: 2022-11-18

## 2022-11-18 RX ORDER — INSULIN LISPRO 100/ML
VIAL (ML) SUBCUTANEOUS
Refills: 0 | Status: DISCONTINUED | OUTPATIENT
Start: 2022-11-18 | End: 2022-11-22

## 2022-11-18 RX ORDER — ASPIRIN/CALCIUM CARB/MAGNESIUM 324 MG
81 TABLET ORAL DAILY
Refills: 0 | Status: DISCONTINUED | OUTPATIENT
Start: 2022-11-18 | End: 2022-11-22

## 2022-11-18 RX ORDER — CEFTRIAXONE 500 MG/1
1000 INJECTION, POWDER, FOR SOLUTION INTRAMUSCULAR; INTRAVENOUS ONCE
Refills: 0 | Status: COMPLETED | OUTPATIENT
Start: 2022-11-18 | End: 2022-11-18

## 2022-11-18 RX ORDER — METOPROLOL TARTRATE 50 MG
25 TABLET ORAL DAILY
Refills: 0 | Status: DISCONTINUED | OUTPATIENT
Start: 2022-11-18 | End: 2022-11-22

## 2022-11-18 RX ORDER — SODIUM CHLORIDE 9 MG/ML
1000 INJECTION INTRAMUSCULAR; INTRAVENOUS; SUBCUTANEOUS ONCE
Refills: 0 | Status: COMPLETED | OUTPATIENT
Start: 2022-11-18 | End: 2022-11-18

## 2022-11-18 RX ORDER — GABAPENTIN 400 MG/1
100 CAPSULE ORAL THREE TIMES A DAY
Refills: 0 | Status: DISCONTINUED | OUTPATIENT
Start: 2022-11-18 | End: 2022-11-22

## 2022-11-18 RX ORDER — PENTOXIFYLLINE 400 MG
400 TABLET, EXTENDED RELEASE ORAL DAILY
Refills: 0 | Status: DISCONTINUED | OUTPATIENT
Start: 2022-11-18 | End: 2022-11-22

## 2022-11-18 RX ORDER — GABAPENTIN 400 MG/1
1 CAPSULE ORAL
Qty: 0 | Refills: 0 | DISCHARGE

## 2022-11-18 RX ORDER — ATORVASTATIN CALCIUM 80 MG/1
20 TABLET, FILM COATED ORAL AT BEDTIME
Refills: 0 | Status: DISCONTINUED | OUTPATIENT
Start: 2022-11-18 | End: 2022-11-22

## 2022-11-18 RX ORDER — DEXTROSE 50 % IN WATER 50 %
12.5 SYRINGE (ML) INTRAVENOUS ONCE
Refills: 0 | Status: DISCONTINUED | OUTPATIENT
Start: 2022-11-18 | End: 2022-11-22

## 2022-11-18 RX ORDER — PILOCARPINE HCL 4 %
2 DROPS OPHTHALMIC (EYE)
Qty: 0 | Refills: 0 | DISCHARGE

## 2022-11-18 RX ORDER — AMLODIPINE BESYLATE 2.5 MG/1
10 TABLET ORAL DAILY
Refills: 0 | Status: DISCONTINUED | OUTPATIENT
Start: 2022-11-18 | End: 2022-11-22

## 2022-11-18 RX ORDER — SITAGLIPTIN 50 MG/1
1 TABLET, FILM COATED ORAL
Qty: 0 | Refills: 0 | DISCHARGE

## 2022-11-18 RX ADMIN — Medication 20 UNIT(S): at 22:07

## 2022-11-18 RX ADMIN — HEPARIN SODIUM 5000 UNIT(S): 5000 INJECTION INTRAVENOUS; SUBCUTANEOUS at 16:05

## 2022-11-18 RX ADMIN — ATORVASTATIN CALCIUM 20 MILLIGRAM(S): 80 TABLET, FILM COATED ORAL at 22:06

## 2022-11-18 RX ADMIN — CEFTRIAXONE 100 MILLIGRAM(S): 500 INJECTION, POWDER, FOR SOLUTION INTRAMUSCULAR; INTRAVENOUS at 06:04

## 2022-11-18 RX ADMIN — DORZOLAMIDE HYDROCHLORIDE 1 DROP(S): 20 SOLUTION/ DROPS OPHTHALMIC at 22:06

## 2022-11-18 RX ADMIN — Medication 2: at 22:06

## 2022-11-18 RX ADMIN — DORZOLAMIDE HYDROCHLORIDE 1 DROP(S): 20 SOLUTION/ DROPS OPHTHALMIC at 16:05

## 2022-11-18 RX ADMIN — AMLODIPINE BESYLATE 10 MILLIGRAM(S): 2.5 TABLET ORAL at 16:04

## 2022-11-18 RX ADMIN — Medication 1 DROP(S): at 14:17

## 2022-11-18 RX ADMIN — Medication 400 MILLIGRAM(S): at 00:46

## 2022-11-18 RX ADMIN — Medication 400 MILLIGRAM(S): at 16:05

## 2022-11-18 RX ADMIN — HEPARIN SODIUM 5000 UNIT(S): 5000 INJECTION INTRAVENOUS; SUBCUTANEOUS at 22:06

## 2022-11-18 RX ADMIN — Medication 81 MILLIGRAM(S): at 13:02

## 2022-11-18 RX ADMIN — GABAPENTIN 100 MILLIGRAM(S): 400 CAPSULE ORAL at 16:04

## 2022-11-18 RX ADMIN — GABAPENTIN 100 MILLIGRAM(S): 400 CAPSULE ORAL at 22:06

## 2022-11-18 RX ADMIN — Medication 1000 MILLIGRAM(S): at 01:15

## 2022-11-18 RX ADMIN — Medication 1: at 14:22

## 2022-11-18 RX ADMIN — SODIUM CHLORIDE 1000 MILLILITER(S): 9 INJECTION INTRAMUSCULAR; INTRAVENOUS; SUBCUTANEOUS at 00:46

## 2022-11-18 RX ADMIN — Medication 1000 MILLIGRAM(S): at 01:00

## 2022-11-18 NOTE — ED ADULT NURSE NOTE - INTERVENTIONS DEFINITIONS
Call bell, personal items and telephone within reach/Instruct patient to call for assistance/Room bathroom lighting operational/Non-slip footwear when patient is off stretcher/Physically safe environment: no spills, clutter or unnecessary equipment/Stretcher in lowest position, wheels locked, appropriate side rails in place

## 2022-11-18 NOTE — ED ADULT NURSE NOTE - SUICIDE SCREENING QUESTION 3
Umbilical hernia repair with mesh  02/15/2018    Active  [id number]  Ventral hernia repair with mesh  02/15/2018    Active  [id number] <<-----Click on this checkbox to enter Procedure No

## 2022-11-18 NOTE — H&P ADULT - NS ATTEND AMEND GEN_ALL_CORE FT
Brenden Krishnan is a 87W with PMH significant for but not limited to DM2 on insulin, HTN, CKD3b (baseline Cr of ~2.0), who is presenting from home with fevers, frequency, suprapubic pain and fall. Patient states that she started to feel ill yesterday had been going to the bathroom frequently and when getting up to go in the evening tripped and had mechanical fall. She states that she had identical symptoms last time she had UTI.     In the ED patient was febrile to 102.4 with mild leukocytosis of 11.5 and was started on CTX.     # UTI - UA is without leuk esterase (microscopy not done) although patient symptomatic and with evidence of retention on CT. Will continue to treat with CTX and follow up cultures   # r/o urinary retention - CT showed large bladder with mild hydroureteronephrosis. Now voiding spontaneously, will check repeat renal bladder US and monitor bladder scans.   # CKD - Cr is at baseline of 2.0. Would continue to hold HCTZ in setting of urinary rentention. Can consider restarting ARB if Cr reamins stable.   # Fall - denies LOC or syncope. States that this was mechanical fall. PT consult.

## 2022-11-18 NOTE — PATIENT PROFILE ADULT - VISION (WITH CORRECTIVE LENSES IF THE PATIENT USUALLY WEARS THEM):
Addended by: DEREK FAM on: 12/14/2018 05:05 PM     Modules accepted: Orders     Normal vision: sees adequately in most situations; can see medication labels, newsprint

## 2022-11-18 NOTE — ED PROVIDER NOTE - ATTENDING CONTRIBUTION TO CARE
Heath Hall DO:  patient seen and evaluated with the resident.  I was present for key portions of the History & Physical, and I agree with the Impression & Plan. 86 yo f pmh DM, HTN, CKD (Baseline Cr 2) , pw fever and abd pain. reports lower abd pain, sharp, worse w/ pressure, unk remitting sx, no prior hx. denies n/v, cp, sob, cough, congestion, ha. unk last bm. arrives hds, well appearing, minimal abd ttp on exam. will check labs, imaging, reassess.

## 2022-11-18 NOTE — PROVIDER CONTACT NOTE (OTHER) - ASSESSMENT
Patient shows no s/s of acute distress. Patient denies feeling dizzy and states "That's normal for me."

## 2022-11-18 NOTE — PATIENT PROFILE ADULT - FALL HARM RISK - HARM RISK INTERVENTIONS
Assistance with ambulation/Assistance OOB with selected safe patient handling equipment/Communicate Risk of Fall with Harm to all staff/Discuss with provider need for PT consult/Monitor gait and stability/Provide patient with walking aids - walker, cane, crutches/Reinforce activity limits and safety measures with patient and family/Sit up slowly, dangle for a short time, stand at bedside before walking/Tailored Fall Risk Interventions/Visual Cue: Yellow wristband and red socks/Bed in lowest position, wheels locked, appropriate side rails in place/Call bell, personal items and telephone in reach/Instruct patient to call for assistance before getting out of bed or chair/Non-slip footwear when patient is out of bed/[place] to call system/Physically safe environment - no spills, clutter or unnecessary equipment/Purposeful Proactive Rounding/Room/bathroom lighting operational, light cord in reach

## 2022-11-18 NOTE — H&P ADULT - RESPIRATORY
normal/clear to auscultation bilaterally/no wheezes/no rales/no rhonchi/no respiratory distress/no use of accessory muscles/no subcutaneous emphysema/airway patent/breath sounds equal

## 2022-11-18 NOTE — ED PROVIDER NOTE - CLINICAL SUMMARY MEDICAL DECISION MAKING FREE TEXT BOX
Ewa Conway DO PGY-2  87 year old female with PMH DM, HTN, CKD (Baseline Cr 2) presents with fever, generalized weakness for 1 day brought in by daughter. States that she also fell today because she lost her balance, unwitnessed. Also endorses LLQ abdominal pain. Will evaluate for infection including pneumonia, UTI, intraabdominal pathology. Will additionally evaluate for intracranial bleed given fall. Will obtain labs, CT, UA, CXR, and re-evalaute for dispo.

## 2022-11-18 NOTE — H&P ADULT - NSICDXPASTSURGICALHX_GEN_ALL_CORE_FT
PAST SURGICAL HISTORY:  No significant past surgical history      PAST SURGICAL HISTORY:  S/P appendectomy

## 2022-11-18 NOTE — ED PROVIDER NOTE - PROGRESS NOTE DETAILS
Ewa Conway DO PGY-2  Spoke with hospitalist Dr. Granados who will accept the patient.   Requested TSH, will follow up results.

## 2022-11-18 NOTE — H&P ADULT - HISTORY OF PRESENT ILLNESS
87 year old female with PMH DM, HTN, CKD (Baseline Cr 2) presents with fever, chills, generalized weakness for 1 day brought in by daughter. States that she also fell today because she lost her balance, unwitnessed. Denies loss of consciousness. States she felt similar symptoms in the past when she had a UTI. Also endorses LLQ abdominal pain. NO chest pain, SOB at rest, COHEN, PND, orthopnea, palpitations, diaphoresis, lightheadedness, dizziness, syncope, increased lower extremity edema,  malaise, myalgias, anorexia, weight changes ( loss or gain), night sweats,  N/V/C/D BRBPR, melena, urinary symptoms, cough, and wheezing.  87 year old female with PMH DM, HTN, CKD (Baseline Cr 2), glaucoma, PVD, neuropathy presents with fever, chills, generalized weakness for 1 day brought in by daughter. States that she also fell today because she lost her balance, unwitnessed. Denies loss of consciousness. States she felt similar symptoms in the past when she had a UTI. Also endorses LLQ abdominal pain now resolved. NO chest pain, SOB at rest, COHEN, PND, orthopnea, palpitations, diaphoresis, lightheadedness, dizziness, syncope, increased lower extremity edema,  malaise, myalgias, anorexia, weight changes ( loss or gain), night sweats,  N/V/C/D BRBPR, melena, urinary symptoms, cough, and wheezing.

## 2022-11-18 NOTE — H&P ADULT - CARDIOVASCULAR
normal/regular rate and rhythm/S1 S2 present/no gallops/no rub/no murmur/no JVD/normal PMI/no pedal edema details…

## 2022-11-18 NOTE — H&P ADULT - NSICDXPASTMEDICALHX_GEN_ALL_CORE_FT
PAST MEDICAL HISTORY:  Chronic kidney disease, unspecified CKD stage     Diabetes     HTN (hypertension)

## 2022-11-18 NOTE — ED PROVIDER NOTE - NS ED ROS FT
Detail Level: Detailed ROS:  -Constitutional: +fever  -Head: Denies headache  -Eyes: Denies blurry vision  -Cardiovascular: Denies chest pain  -Pulmonary: Denies shortness of breath  -Gastrointestinal: Denies nausea or diarrhea  -Genitourinary: Denies dysuria  -Skin: Denies new rashes  -Neuro: Denies numbness or tingling

## 2022-11-18 NOTE — ED ADULT NURSE REASSESSMENT NOTE - NS ED NURSE REASSESS COMMENT FT1
Patient turned and repositioned for comfort. Respirations appear unlabored. Patient denies chest pain, SOB, fevers, chills. MD made aware of BP. As per pt.'s daughter, patients diastolic always runs low. Call bell within reach. Will continue to monitor.

## 2022-11-18 NOTE — ED PROVIDER NOTE - PHYSICAL EXAMINATION
PHYSICAL EXAM:  CONSTITUTIONAL: Well appearing, awake, alert, oriented to person, place, time/situation and in no apparent distress.  HEAD: Atraumatic  EYES: Clear bilaterally, pupils equal, round and reactive to light.  ENMT: Airway patent, Nasal mucosa clear. Mouth with normal mucosa. Uvula is midline.   CARDIAC: Normal rate, regular rhythm. +S1/S2. No murmurs, rubs or gallops.  RESPIRATORY: Breathing unlabored. Breath sounds clear and equal bilaterally.  ABDOMEN:  Soft, nontender, nondistended. No rebound tenderness or guarding.  FLANK: No CVA tenderness B/L.  NEUROLOGICAL: Alert and oriented, no focal deficits, no motor or sensory deficits. CN2-12 intact. Sensation intact x4 extremities. Strength 5/5 of upper and lower extremities B/L.  MSK: No clubbing, cyanosis, or edema. Full range of motion of all extremities. No tenderness to palpation. No midline or paraspinal tenderness. No spinal step-offs.  SKIN: Skin warm and dry. No evidence of rashes or lesions.

## 2022-11-18 NOTE — H&P ADULT - ASSESSMENT
87 year old female with PMH DM, HTN, CKD (Baseline Cr 2) presents with fever, chills, generalized weakness for 1 day brought in by daughter. States that she also fell today because she lost her balance, unwitnessed.

## 2022-11-18 NOTE — ED ADULT NURSE NOTE - OBJECTIVE STATEMENT
Received patient to room 19, A&OX4, ambulatory w/ cane at bedside. Pt. c/o weakness, fevers of 102, and x2 mechanical falls. denies LOC, head trauma, or use of blood thinners. Patient also endorsing left-sided abdominal pain. Denies chest pain, SOB, nausea, or vomiting. 20 G IV established to L Hand. Labs drawn and sent. Call bell within reach. Will continue to monitor.

## 2022-11-18 NOTE — PATIENT PROFILE ADULT - FUNCTIONAL ASSESSMENT - BASIC MOBILITY 2.
Implemented All Universal Safety Interventions:  [facility] to call system. Call bell, personal items and telephone within reach. Instruct patient to call for assistance. Room bathroom lighting operational. Non-slip footwear when patient is off stretcher. Physically safe environment: no spills, clutter or unnecessary equipment. Stretcher in lowest position, wheels locked, appropriate side rails in place. 3 = A little assistance

## 2022-11-18 NOTE — ED ADULT NURSE REASSESSMENT NOTE - NS ED NURSE REASSESS COMMENT FT1
Report received from night RN. Pt resting comfortably in bed, she is a&ox4, breathing spontaneously and nonlabored, no acute distress in appearance. Pt denies presence of any pain, currently has no complaints. Vitals as charted. Morning labs drawn and sent, ekg completed. Pt aware of plan, awaiting echo. Fall precautions in place, call bell within reach, bed in lowest position. Report received from night RN. Pt resting comfortably in bed, she is a&ox4, breathing spontaneously and nonlabored, no acute distress in appearance. Pt denies presence of any pain, currently has no complaints. Vitals as charted. Morning labs drawn and sent, ekg completed, continuous cardiac monitoring shows normal sinus rhythm. Pt aware of plan, awaiting echo. Fall precautions in place, call bell within reach, bed in lowest position.

## 2022-11-18 NOTE — ED PROVIDER NOTE - OBJECTIVE STATEMENT
87 year old female with PMH DM, HTN, CKD (Baseline Cr 2) presents with fever, generalized weakness for 1 day brought in by daughter. States that she also fell today because she lost her balance, unwitnessed. Denies loss of consciousness. States she felt similar symptoms in the past when she had a UTI. Also endorses LLQ abdominal pain. Denies headache, chest pain, shortness of breath, nausea, vomiting, diarrhea, dysuria, hematuria, diarrhea, blood in stools.

## 2022-11-18 NOTE — H&P ADULT - GASTROINTESTINAL
normal/soft/nontender/nondistended/normal active bowel sounds/no guarding/no rigidity/no organomegaly/no palpable saurabh/no masses palpable details…

## 2022-11-18 NOTE — H&P ADULT - CONSTITUTIONAL
1800  TRANSFER - IN REPORT:    Verbal report received from Garret (name) on John Perdomo  being received from ED (unit) for routine progression of care      Report consisted of patients Situation, Background, Assessment and   Recommendations(SBAR). Information from the following report(s) SBAR, ED Summary, Intake/Output, MAR, Recent Results and Cardiac Rhythm A-fib was reviewed with the receiving nurse. Opportunity for questions and clarification was provided. Assessment completed upon patients arrival to unit and care assumed. 1815  Pt arrived to floor. 1900  Bedside and Verbal shift change report given to Walter Olson (oncoming nurse) by Lynsey Fiore RN (offgoing nurse). Report included the following information SBAR, ED Summary, Intake/Output, Recent Results and Cardiac Rhythm A-fib. normal/well-groomed/no distress

## 2022-11-18 NOTE — H&P ADULT - PROBLEM SELECTOR PLAN 1
Generalized weakness, LLQ abdominal pain , Leukocytosis / Fever possibly due to UTI from urinary retention  COVID neg; CXR: [facility]   UA: trace protein  WBC =11.5 86 % PMN  CT head: neg  CT abd pelvis: Moderately distended urinary bladder. Correlate clinically for urinary   retention.Mild bilateral hydroureteronephrosis to the level of the distended  urinary bladder. No obstructing urinary tract calculus.  Urine cx/ Blood cutlure- P  Patient now urinating freely - check I & Os . Check post void residual- if bladder scan > 300cc then initiate straight cath protocol

## 2022-11-19 ENCOUNTER — TRANSCRIPTION ENCOUNTER (OUTPATIENT)
Age: 87
End: 2022-11-19

## 2022-11-19 LAB
ANION GAP SERPL CALC-SCNC: 11 MMOL/L — SIGNIFICANT CHANGE UP (ref 7–14)
BUN SERPL-MCNC: 32 MG/DL — HIGH (ref 7–23)
CALCIUM SERPL-MCNC: 9.2 MG/DL — SIGNIFICANT CHANGE UP (ref 8.4–10.5)
CHLORIDE SERPL-SCNC: 108 MMOL/L — HIGH (ref 98–107)
CO2 SERPL-SCNC: 21 MMOL/L — LOW (ref 22–31)
CREAT SERPL-MCNC: 1.46 MG/DL — HIGH (ref 0.5–1.3)
EGFR: 35 ML/MIN/1.73M2 — LOW
GLUCOSE BLDC GLUCOMTR-MCNC: 113 MG/DL — HIGH (ref 70–99)
GLUCOSE BLDC GLUCOMTR-MCNC: 219 MG/DL — HIGH (ref 70–99)
GLUCOSE BLDC GLUCOMTR-MCNC: 229 MG/DL — HIGH (ref 70–99)
GLUCOSE BLDC GLUCOMTR-MCNC: 233 MG/DL — HIGH (ref 70–99)
GLUCOSE BLDC GLUCOMTR-MCNC: 42 MG/DL — CRITICAL LOW (ref 70–99)
GLUCOSE BLDC GLUCOMTR-MCNC: 46 MG/DL — CRITICAL LOW (ref 70–99)
GLUCOSE BLDC GLUCOMTR-MCNC: 46 MG/DL — CRITICAL LOW (ref 70–99)
GLUCOSE BLDC GLUCOMTR-MCNC: 53 MG/DL — CRITICAL LOW (ref 70–99)
GLUCOSE BLDC GLUCOMTR-MCNC: 88 MG/DL — SIGNIFICANT CHANGE UP (ref 70–99)
GLUCOSE BLDC GLUCOMTR-MCNC: 93 MG/DL — SIGNIFICANT CHANGE UP (ref 70–99)
GLUCOSE SERPL-MCNC: 42 MG/DL — CRITICAL LOW (ref 70–99)
HCT VFR BLD CALC: 30.1 % — LOW (ref 34.5–45)
HGB BLD-MCNC: 9.9 G/DL — LOW (ref 11.5–15.5)
MAGNESIUM SERPL-MCNC: 2 MG/DL — SIGNIFICANT CHANGE UP (ref 1.6–2.6)
MCHC RBC-ENTMCNC: 30.5 PG — SIGNIFICANT CHANGE UP (ref 27–34)
MCHC RBC-ENTMCNC: 32.9 GM/DL — SIGNIFICANT CHANGE UP (ref 32–36)
MCV RBC AUTO: 92.6 FL — SIGNIFICANT CHANGE UP (ref 80–100)
NRBC # BLD: 0 /100 WBCS — SIGNIFICANT CHANGE UP (ref 0–0)
NRBC # FLD: 0 K/UL — SIGNIFICANT CHANGE UP (ref 0–0)
PHOSPHATE SERPL-MCNC: 2.6 MG/DL — SIGNIFICANT CHANGE UP (ref 2.5–4.5)
PLATELET # BLD AUTO: 176 K/UL — SIGNIFICANT CHANGE UP (ref 150–400)
POTASSIUM SERPL-MCNC: 3.4 MMOL/L — LOW (ref 3.5–5.3)
POTASSIUM SERPL-SCNC: 3.4 MMOL/L — LOW (ref 3.5–5.3)
RBC # BLD: 3.25 M/UL — LOW (ref 3.8–5.2)
RBC # FLD: 15.4 % — HIGH (ref 10.3–14.5)
SODIUM SERPL-SCNC: 140 MMOL/L — SIGNIFICANT CHANGE UP (ref 135–145)
WBC # BLD: 7.47 K/UL — SIGNIFICANT CHANGE UP (ref 3.8–10.5)
WBC # FLD AUTO: 7.47 K/UL — SIGNIFICANT CHANGE UP (ref 3.8–10.5)

## 2022-11-19 PROCEDURE — 99232 SBSQ HOSP IP/OBS MODERATE 35: CPT

## 2022-11-19 RX ORDER — POTASSIUM CHLORIDE 20 MEQ
40 PACKET (EA) ORAL ONCE
Refills: 0 | Status: COMPLETED | OUTPATIENT
Start: 2022-11-19 | End: 2022-11-19

## 2022-11-19 RX ORDER — LANOLIN ALCOHOL/MO/W.PET/CERES
3 CREAM (GRAM) TOPICAL AT BEDTIME
Refills: 0 | Status: COMPLETED | OUTPATIENT
Start: 2022-11-19 | End: 2022-11-20

## 2022-11-19 RX ORDER — DEXTROSE 50 % IN WATER 50 %
25 SYRINGE (ML) INTRAVENOUS ONCE
Refills: 0 | Status: COMPLETED | OUTPATIENT
Start: 2022-11-19 | End: 2022-11-19

## 2022-11-19 RX ORDER — INSULIN DETEMIR 100/ML (3)
15 INSULIN PEN (ML) SUBCUTANEOUS AT BEDTIME
Refills: 0 | Status: DISCONTINUED | OUTPATIENT
Start: 2022-11-19 | End: 2022-11-20

## 2022-11-19 RX ORDER — DEXTROSE 50 % IN WATER 50 %
15 SYRINGE (ML) INTRAVENOUS ONCE
Refills: 0 | Status: COMPLETED | OUTPATIENT
Start: 2022-11-19 | End: 2022-11-19

## 2022-11-19 RX ADMIN — Medication 81 MILLIGRAM(S): at 13:57

## 2022-11-19 RX ADMIN — GABAPENTIN 100 MILLIGRAM(S): 400 CAPSULE ORAL at 05:03

## 2022-11-19 RX ADMIN — DORZOLAMIDE HYDROCHLORIDE 1 DROP(S): 20 SOLUTION/ DROPS OPHTHALMIC at 13:56

## 2022-11-19 RX ADMIN — Medication 15 GRAM(S): at 07:46

## 2022-11-19 RX ADMIN — HEPARIN SODIUM 5000 UNIT(S): 5000 INJECTION INTRAVENOUS; SUBCUTANEOUS at 21:35

## 2022-11-19 RX ADMIN — Medication 25 GRAM(S): at 08:05

## 2022-11-19 RX ADMIN — Medication 2: at 12:23

## 2022-11-19 RX ADMIN — Medication 15 UNIT(S): at 21:38

## 2022-11-19 RX ADMIN — CEFTRIAXONE 100 MILLIGRAM(S): 500 INJECTION, POWDER, FOR SOLUTION INTRAMUSCULAR; INTRAVENOUS at 05:12

## 2022-11-19 RX ADMIN — GABAPENTIN 100 MILLIGRAM(S): 400 CAPSULE ORAL at 21:36

## 2022-11-19 RX ADMIN — DORZOLAMIDE HYDROCHLORIDE 1 DROP(S): 20 SOLUTION/ DROPS OPHTHALMIC at 06:37

## 2022-11-19 RX ADMIN — HEPARIN SODIUM 5000 UNIT(S): 5000 INJECTION INTRAVENOUS; SUBCUTANEOUS at 05:03

## 2022-11-19 RX ADMIN — DORZOLAMIDE HYDROCHLORIDE 1 DROP(S): 20 SOLUTION/ DROPS OPHTHALMIC at 21:37

## 2022-11-19 RX ADMIN — HEPARIN SODIUM 5000 UNIT(S): 5000 INJECTION INTRAVENOUS; SUBCUTANEOUS at 13:56

## 2022-11-19 RX ADMIN — Medication 25 MILLIGRAM(S): at 05:04

## 2022-11-19 RX ADMIN — Medication 400 MILLIGRAM(S): at 13:57

## 2022-11-19 RX ADMIN — Medication 1 DROP(S): at 13:56

## 2022-11-19 RX ADMIN — Medication 1 DROP(S): at 23:28

## 2022-11-19 RX ADMIN — Medication 3 MILLIGRAM(S): at 23:28

## 2022-11-19 RX ADMIN — Medication 40 MILLIEQUIVALENT(S): at 08:55

## 2022-11-19 RX ADMIN — ATORVASTATIN CALCIUM 20 MILLIGRAM(S): 80 TABLET, FILM COATED ORAL at 21:36

## 2022-11-19 RX ADMIN — AMLODIPINE BESYLATE 10 MILLIGRAM(S): 2.5 TABLET ORAL at 05:03

## 2022-11-19 RX ADMIN — Medication 1 DROP(S): at 21:36

## 2022-11-19 RX ADMIN — GABAPENTIN 100 MILLIGRAM(S): 400 CAPSULE ORAL at 13:57

## 2022-11-19 NOTE — PHYSICAL THERAPY INITIAL EVALUATION ADULT - GENERAL OBSERVATIONS, REHAB EVAL
Pt received seated at bedside , + bed alarm , +tele , slight difficulty with speech finding words, pt in no apparent distress, FABI Quiros in the room.

## 2022-11-19 NOTE — PROGRESS NOTE ADULT - PROBLEM SELECTOR PLAN 1
Generalized weakness, LLQ abdominal pain , Leukocytosis / Fever possibly due to UTI from urinary retention  COVID neg; CXR: [facility]   UA: trace protein, neg nitrite/leukocyte  WBC =11.5 86 % PMN  CT head: neg  CT abd pelvis: Moderately distended urinary bladder. Correlate clinically for urinary   retention. Mild bilateral hydroureteronephrosis to the level of the distended  urinary bladder. No obstructing urinary tract calculus.  Renal US: Mild fullness of the left renal pelvis without bilateral hydronephrosis.  Urine cx/ Blood culture sent, UA neg  Patient now urinating freely - check I & Os . Check post void residual- if bladder scan > 300cc then initiate straight cath protocol

## 2022-11-19 NOTE — PROGRESS NOTE ADULT - SUBJECTIVE AND OBJECTIVE BOX
Dr. Cristy Renner  Pager [phone]    PROGRESS NOTE:     Patient is a 87y old  Female who presents with a chief complaint of     SUBJECTIVE / OVERNIGHT EVENTS: denies chest pain/sob/dizziness, lost her balance and fell at home  ADDITIONAL REVIEW OF SYSTEMS: afebrile overnight, denies dysuria/frequency    MEDICATIONS  (STANDING):  amLODIPine   Tablet 10 milliGRAM(s) Oral daily  aspirin enteric coated 81 milliGRAM(s) Oral daily  atorvastatin 20 milliGRAM(s) Oral at bedtime  cefTRIAXone   IVPB 1000 milliGRAM(s) IV Intermittent every 24 hours  dextrose 5%. 1000 milliLiter(s) (50 mL/Hr) IV Continuous <Continuous>  dextrose 5%. 1000 milliLiter(s) (100 mL/Hr) IV Continuous <Continuous>  dextrose 50% Injectable 25 Gram(s) IV Push once  dextrose 50% Injectable 12.5 Gram(s) IV Push once  dextrose 50% Injectable 25 Gram(s) IV Push once  dorzolamide 2% Ophthalmic Solution 1 Drop(s) Both EYES three times a day  gabapentin 100 milliGRAM(s) Oral three times a day  glucagon  Injectable 1 milliGRAM(s) IntraMuscular once  heparin   Injectable 5000 Unit(s) SubCutaneous every 8 hours  insulin detemir injectable (LEVEMIR) 15 Unit(s) SubCutaneous at bedtime  insulin lispro (ADMELOG) corrective regimen sliding scale   SubCutaneous three times a day before meals  insulin lispro (ADMELOG) corrective regimen sliding scale   SubCutaneous at bedtime  metoprolol succinate ER 25 milliGRAM(s) Oral daily  pentoxifylline 400 milliGRAM(s) Oral daily  pilocarpine 4% Solution 1 Drop(s) Right EYE four times a day    MEDICATIONS  (PRN):  dextrose Oral Gel 15 Gram(s) Oral once PRN Blood Glucose LESS THAN 70 milliGRAM(s)/deciliter      CAPILLARY BLOOD GLUCOSE      POCT Blood Glucose.: 233 mg/dL (2022 11:47)  POCT Blood Glucose.: 219 mg/dL (2022 08:29)  POCT Blood Glucose.: 229 mg/dL (2022 08:28)  POCT Blood Glucose.: 53 mg/dL (2022 08:04)  POCT Blood Glucose.: 42 mg/dL (2022 07:41)  POCT Blood Glucose.: 46 mg/dL (2022 07:14)  POCT Blood Glucose.: 46 mg/dL (2022 07:13)  POCT Blood Glucose.: 88 mg/dL (2022 07:09)  POCT Blood Glucose.: 326 mg/dL (2022 21:45)  POCT Blood Glucose.: 114 mg/dL (2022 17:53)  POCT Blood Glucose.: 161 mg/dL (2022 13:42)    I&O's Summary      PHYSICAL EXAM:  Vital Signs Last 24 Hrs  T(C): 36.6 (2022 12:24), Max: 37.6 (2022 14:18)  T(F): 97.8 (2022 12:24), Max: 99.7 (2022 14:18)  HR: 62 (2022 12:24) (56 - 74)  BP: 148/53 (2022 12:24) (122/50 - 148/53)  BP(mean): --  RR: 17 (2022 12:24) (13 - 18)  SpO2: 100% (2022 12:24) (100% - 100%)    Parameters below as of 2022 12:24  Patient On (Oxygen Delivery Method): room air      CONSTITUTIONAL: NAD, well-developed  RESPIRATORY: Normal respiratory effort; lungs are clear to auscultation bilaterally  CARDIOVASCULAR: Regular rate and rhythm, normal S1 and S2, no murmur/rub/gallop; No lower extremity edema; Peripheral pulses are 2+ bilaterally  ABDOMEN: Nontender to palpation, normoactive bowel sounds, no rebound/guarding; No hepatosplenomegaly  MUSCULOSKELETAL: no clubbing or cyanosis of digits; no joint swelling or tenderness to palpation  PSYCH: A+O to person, place, and time; affect appropriate    LABS:                        9.9    7.47  )-----------( 176      ( 2022 06:00 )             30.1     11-19    140  |  108<H>  |  32<H>  ----------------------------<  42<LL>  3.4<L>   |  21<L>  |  1.46<H>    Ca    9.2      2022 06:00  Phos  2.6       Mg     2.00         TPro  7.4  /  Alb  4.3  /  TBili  0.4  /  DBili  x   /  AST  29  /  ALT  21  /  AlkPhos  65            Urinalysis Basic - ( 2022 01:59 )    Color: Yellow / Appearance: Clear / S.017 / pH: x  Gluc: x / Ketone: Negative  / Bili: Negative / Urobili: <2 mg/dL   Blood: x / Protein: Trace / Nitrite: Negative   Leuk Esterase: Negative / RBC: x / WBC x   Sq Epi: x / Non Sq Epi: x / Bacteria: x        Culture - Blood (collected 2022 00:50)  Source: .Blood Blood-Peripheral  Preliminary Report (2022 07:02):    No growth to date.    Culture - Blood (collected 2022 00:40)  Source: .Blood Blood-Peripheral  Preliminary Report (2022 07:02):    No growth to date.        RADIOLOGY & ADDITIONAL TESTS:  Results Reviewed:   Imaging Personally Reviewed:  < from: US Kidney and Bladder (22 @ 12:30) >  IMPRESSION:  Mild fullness of the left renal pelvis without bilateral hydronephrosis.      < from: CT Abdomen and Pelvis No Cont (22 @ 04:39) >    Moderately distended urinary bladder. Correlate clinically for urinary   retention.    Mild bilateral hydroureteronephrosis to the level of the distended   urinary bladder. No obstructing urinary tract calculus.      Electrocardiogram Personally Reviewed:    COORDINATION OF CARE:  Care Discussed with Consultants/Other Providers [Y/N]: milan merrill, bladder scan, f/u cultures  Prior or Outpatient Records Reviewed [Y/N]:   Dr. Cristy Renner  Pager [phone]    PROGRESS NOTE:     Patient is a 87y old  Female who presents with a chief complaint of     SUBJECTIVE / OVERNIGHT EVENTS: denies chest pain/sob/dizziness, lost her balance and fell at home  ADDITIONAL REVIEW OF SYSTEMS: afebrile overnight, denies dysuria/frequency    MEDICATIONS  (STANDING):  amLODIPine   Tablet 10 milliGRAM(s) Oral daily  aspirin enteric coated 81 milliGRAM(s) Oral daily  atorvastatin 20 milliGRAM(s) Oral at bedtime  cefTRIAXone   IVPB 1000 milliGRAM(s) IV Intermittent every 24 hours  dextrose 5%. 1000 milliLiter(s) (50 mL/Hr) IV Continuous <Continuous>  dextrose 5%. 1000 milliLiter(s) (100 mL/Hr) IV Continuous <Continuous>  dextrose 50% Injectable 25 Gram(s) IV Push once  dextrose 50% Injectable 12.5 Gram(s) IV Push once  dextrose 50% Injectable 25 Gram(s) IV Push once  dorzolamide 2% Ophthalmic Solution 1 Drop(s) Both EYES three times a day  gabapentin 100 milliGRAM(s) Oral three times a day  glucagon  Injectable 1 milliGRAM(s) IntraMuscular once  heparin   Injectable 5000 Unit(s) SubCutaneous every 8 hours  insulin detemir injectable (LEVEMIR) 15 Unit(s) SubCutaneous at bedtime  insulin lispro (ADMELOG) corrective regimen sliding scale   SubCutaneous three times a day before meals  insulin lispro (ADMELOG) corrective regimen sliding scale   SubCutaneous at bedtime  metoprolol succinate ER 25 milliGRAM(s) Oral daily  pentoxifylline 400 milliGRAM(s) Oral daily  pilocarpine 4% Solution 1 Drop(s) Right EYE four times a day    MEDICATIONS  (PRN):  dextrose Oral Gel 15 Gram(s) Oral once PRN Blood Glucose LESS THAN 70 milliGRAM(s)/deciliter      CAPILLARY BLOOD GLUCOSE      POCT Blood Glucose.: 233 mg/dL (2022 11:47)  POCT Blood Glucose.: 219 mg/dL (2022 08:29)  POCT Blood Glucose.: 229 mg/dL (2022 08:28)  POCT Blood Glucose.: 53 mg/dL (2022 08:04)  POCT Blood Glucose.: 42 mg/dL (2022 07:41)  POCT Blood Glucose.: 46 mg/dL (2022 07:14)  POCT Blood Glucose.: 46 mg/dL (2022 07:13)  POCT Blood Glucose.: 88 mg/dL (2022 07:09)  POCT Blood Glucose.: 326 mg/dL (2022 21:45)  POCT Blood Glucose.: 114 mg/dL (2022 17:53)  POCT Blood Glucose.: 161 mg/dL (2022 13:42)    I&O's Summary      PHYSICAL EXAM:  Vital Signs Last 24 Hrs  T(C): 36.6 (2022 12:24), Max: 37.6 (2022 14:18)  T(F): 97.8 (2022 12:24), Max: 99.7 (2022 14:18)  HR: 62 (2022 12:24) (56 - 74)  BP: 148/53 (2022 12:24) (122/50 - 148/53)  BP(mean): --  RR: 17 (2022 12:24) (13 - 18)  SpO2: 100% (2022 12:24) (100% - 100%)    Parameters below as of 2022 12:24  Patient On (Oxygen Delivery Method): room air      CONSTITUTIONAL: NAD, well-developed  RESPIRATORY: Normal respiratory effort; lungs are clear to auscultation bilaterally  CARDIOVASCULAR: Regular rate and rhythm, normal S1 and S2, no murmur/rub/gallop; No lower extremity edema; Peripheral pulses are 2+ bilaterally  ABDOMEN: Nontender to palpation, normoactive bowel sounds, no rebound/guarding; No hepatosplenomegaly  MUSCULOSKELETAL: no clubbing or cyanosis of digits; no joint swelling or tenderness to palpation  PSYCH: A+O to person, place, and time; affect appropriate    LABS:                        9.9    7.47  )-----------( 176      ( 2022 06:00 )             30.1     11-19    140  |  108<H>  |  32<H>  ----------------------------<  42<LL>  3.4<L>   |  21<L>  |  1.46<H>    Ca    9.2      2022 06:00  Phos  2.6       Mg     2.00         TPro  7.4  /  Alb  4.3  /  TBili  0.4  /  DBili  x   /  AST  29  /  ALT  21  /  AlkPhos  65            Urinalysis Basic - ( 2022 01:59 )    Color: Yellow / Appearance: Clear / S.017 / pH: x  Gluc: x / Ketone: Negative  / Bili: Negative / Urobili: <2 mg/dL   Blood: x / Protein: Trace / Nitrite: Negative   Leuk Esterase: Negative / RBC: x / WBC x   Sq Epi: x / Non Sq Epi: x / Bacteria: x        Culture - Blood (collected 2022 00:50)  Source: .Blood Blood-Peripheral  Preliminary Report (2022 07:02):    No growth to date.    Culture - Blood (collected 2022 00:40)  Source: .Blood Blood-Peripheral  Preliminary Report (2022 07:02):    No growth to date.        RADIOLOGY & ADDITIONAL TESTS:  Results Reviewed:   Imaging Personally Reviewed:  < from: US Kidney and Bladder (22 @ 12:30) >  IMPRESSION:  Mild fullness of the left renal pelvis without bilateral hydronephrosis.      < from: CT Abdomen and Pelvis No Cont (22 @ 04:39) >    Moderately distended urinary bladder. Correlate clinically for urinary   retention.    Mild bilateral hydroureteronephrosis to the level of the distended   urinary bladder. No obstructing urinary tract calculus.    < from: Transthoracic Echocardiogram (22 @ 14:30) >  CONCLUSIONS:  LVEF 69%  1. Mitral annular calcification, otherwise normal mitral  valve. Minimal mitral regurgitation.  2. Normal left ventricular internal dimensions and wall  thicknesses.  3. Normal left ventricular systolic function. No segmental  wall motion abnormalities.  4. Mild diastolic dysfunction (Stage I).  5. Normal right ventricular size and function.        Electrocardiogram Personally Reviewed:    COORDINATION OF CARE:  Care Discussed with Consultants/Other Providers [Y/N]: milan merrill, bladder scan, f/u cultures  Prior or Outpatient Records Reviewed [Y/N]:

## 2022-11-19 NOTE — CHART NOTE - NSCHARTNOTEFT_GEN_A_CORE
Patient with hypoglycemia on BMP and fingerstick glucose to 40s. Hypoglycemia protocol followed. Levemir decreased from 20U to 15U QHS. Will monitor fingerstick glucose levels closely. Patient with hypoglycemia on BMP and fingerstick glucose to 40s. Patient asymptomatic, alert and oriented. Hypoglycemia protocol followed. Levemir decreased from 20U to 15U QHS. Will monitor fingerstick glucose levels closely.

## 2022-11-19 NOTE — DISCHARGE NOTE PROVIDER - HOSPITAL COURSE
87F PMH of DM, HTN, CKD (baseline Cr 2) p/w fever, generalized weakness, unsteady gait s/p unwitnessed fall also c/o LLQ abdominal pain with UTI.    Acute UTI  - generalized weakness, LLQ abdominal pain, leukocytosis, fever possible 2/2 UTI from urinary retention  - COVID neg; CXR: WATSON; UA: trace protei; WBC =11.5, 86% PMNs  - CT head: negative  - CT A/P: moderately distended urinary bladder; mild b/l hydroureteronephrosis to level of distended urinary bladder; no obstructing urinary tract calculus  - UCx ___  - BCx: NGTD ___  - patient urinating, not retaining; bladder scan PVR 0mL 11/19    Fall  - pt reports she lost her balance and fell at home  - c/w telemetry monitoring  - EKG: w/o ischemic changes, SR @ 73 bpm + LVH  - TTE: normal LV/RV function, EF 69%, no wall motion abnormalities, mild diastolic dysfunction  - PT: TAZ    DM  - A1C 7.3%  - c/w sliding scale insulin  - c/w Levemir, decreased to 15U QHS 2/2 hypoglycemia to 40s 11/19    HTN/HLD  - c/w amlodipine and metoprolol; c/w DASH diet  - c/w Lipitor    Chronic kidney disease, unspecified CKD stage  - unclear baseline Cr; monitor creatinine   - continue to hold losartan and HCTZ for now  - renal US: mild fullness of L renal pelvis w/o b/l hydronephrosis  - bladder scan negative 11/19, PVR 0mL    Glaucoma  - c/w pilocarpine, dorzolamide eye drops    Peripheral vascular disease  - c/w jyotsna    Case discussed with  ___ on ___. Patient is medically stable and optimized for discharge as per attending. All medications were reviewed and prescriptions were sent to a mutually agreed upon pharmacy. Discharge plan reviewed with patient and/or family. 87F PMH of DM, HTN, CKD (baseline Cr 2) p/w fever, generalized weakness, unsteady gait s/p unwitnessed fall also c/o LLQ abdominal pain with UTI.    Acute UTI  - generalized weakness, LLQ abdominal pain, leukocytosis, fever possible 2/2 UTI from urinary retention  - COVID neg; CXR: WATSON; UA: trace protei; WBC =11.5, 86% PMNs  - CT head: negative  - CT A/P: moderately distended urinary bladder; mild b/l hydroureteronephrosis to level of distended urinary bladder; no obstructing urinary tract calculus  - BCx: NGTD   - patient urinating, not retaining; bladder scan PVR 0mL 11/19    Fall  - pt reports she lost her balance and fell at home  - c/w telemetry monitoring  - EKG: w/o ischemic changes, SR @ 73 bpm + LVH  - TTE: normal LV/RV function, EF 69%, no wall motion abnormalities, mild diastolic dysfunction  - PT: TAZ    DM  - A1C 7.3%  - c/w sliding scale insulin  - c/w Levemir, decreased to 15U QHS 2/2 hypoglycemia to 40s 11/19    HTN/HLD  - c/w amlodipine and metoprolol; c/w DASH diet  - c/w Lipitor    Chronic kidney disease, unspecified CKD stage  - unclear baseline Cr; monitor creatinine   - continue to hold losartan and HCTZ for now  - renal US: mild fullness of L renal pelvis w/o b/l hydronephrosis  - bladder scan negative 11/19, PVR 0mL    Glaucoma  - c/w pilocarpine, dorzolamide eye drops    Peripheral vascular disease  - c/w trental    Case discussed with Dr. Alvarez on 11/22/2022. Patient is medically stable and optimized for discharge as per attending. All medications were reviewed and prescriptions were sent to a mutually agreed upon pharmacy. Discharge plan reviewed with patient and/or family. 87F PMH of DM, HTN, CKD (baseline Cr 2) p/w fever, generalized weakness, unsteady gait s/p unwitnessed fall also c/o LLQ abdominal pain with UTI.    Acute UTI  - generalized weakness, LLQ abdominal pain, leukocytosis, fever possible 2/2 UTI from urinary retention  - COVID neg; CXR: WATSON; UA: trace protei; WBC =11.5, 86% PMNs  - CT head: negative  - CT A/P: moderately distended urinary bladder; mild b/l hydroureteronephrosis to level of distended urinary bladder; no obstructing urinary tract calculus  - BCx: NGTD   - UA neg, Bcx ngtd, completed 3 days of CTX, urine culture still in lab since 18th? unclear why, however pt has completed 3 days of abx  -Bladder scan 11/19 postvoid 0cc per nurse  patient urinating, not retaining; bladder scan PVR 0mL 11/19      Fall  - pt reports she lost her balance and fell at home  - c/w telemetry monitoring  - EKG: w/o ischemic changes, SR @ 73 bpm + LVH  - TTE: normal LV/RV function, EF 69%, no wall motion abnormalities, mild diastolic dysfunction  - PT: TAZ  pt and family declined rehab, wanted to go home    DM  - A1C 7.3%  - c/w sliding scale insulin  - c/w Levemir, decreased to 12 QHS 2/2 hypoglycemia to 40s 11/19    HTN/HLD  - c/w amlodipine and metoprolol; c/w DASH diet  - c/w Lipitor  dc hctz and losartan upon dc    Chronic kidney disease, unspecified CKD stage  - unclear baseline Cr; monitor creatinine   - continue to hold losartan and HCTZ for now  - renal US: mild fullness of L renal pelvis w/o b/l hydronephrosis  - bladder scan negative 11/19, PVR 0mL    Glaucoma  - c/w pilocarpine, dorzolamide eye drops    Peripheral vascular disease  - c/w trental  stable for dc    Case discussed with Dr. Alvarez on 11/22/2022. Patient is medically stable and optimized for discharge as per attending. All medications were reviewed and prescriptions were sent to a mutually agreed upon pharmacy. Discharge plan reviewed with patient and/or family.

## 2022-11-19 NOTE — DISCHARGE NOTE PROVIDER - NSDCCPCAREPLAN_GEN_ALL_CORE_FT
PRINCIPAL DISCHARGE DIAGNOSIS  Diagnosis: Acute UTI  Assessment and Plan of Treatment: You were treated for a UTI while in the hospital   Follow up with your outpatient provider      SECONDARY DISCHARGE DIAGNOSES  Diagnosis: Diabetes  Assessment and Plan of Treatment: Your insulin was lowered to 12 units.   Continue your medication regimen and a consistent carbohydrate diet (Meaning eating the same amount of carbohydrates at the same time each day). Monitor blood glucose levels throughout the day before meals and at bedtime. Record blood sugars and bring to outpatient providers appointment in order to be reviewed by your doctor for management modifications. If your sugars are more than 400 or less than 70 you should contact your PCP immediately.   Monitor for signs/symptoms of low blood glucose, such as, dizziness, altered mental status, or cool/clammy skin. In addition, monitor for signs/symptoms of high blood glucose, such as, feeling hot, dry, fatigued, or with increased thirst/urination.   Make regular podiatry appointments in order to have feet checked for wounds and uncontrolled toe nail growth to prevent infections, as well as, appointments with an ophthalmologist to monitor your vision.  Follow up with your out patient provider within 1 week.,      Diagnosis: Abnormal LFTs  Assessment and Plan of Treatment: Your found to have elevated Liver function test function.  US of the abdomen was done. The result was: Unremarkable sonographic appearance of the liver.  Cholelithiasis. No biliary ductal dilatation.  Follow up with PCP within 1 week.       Diagnosis: HTN (hypertension)  Assessment and Plan of Treatment: Continue blood pressure medication regimen as directed. Monitor for any visual changes, headaches or dizziness.  Monitor blood pressure regularly.  Follow up with your primary care provider for further management for high blood pressure.      Diagnosis: Fall  Assessment and Plan of Treatment:   You were admitted to the hospital after a fall. Please maintain a safe environment where you reside. Place night lights in the hallways and non-slip rugs/mats on hard surfaces. It is recommended that you move in a careful manner to prevent future events. Perform any exercises recommended by physical therapy and follow-up with your primary care provider.

## 2022-11-19 NOTE — PHYSICAL THERAPY INITIAL EVALUATION ADULT - PATIENT PROFILE REVIEW, REHAB EVAL
ok for PT evaluation for OOB activities/amb per FABI Quiros., activity order- amb with assistance/yes

## 2022-11-19 NOTE — PROGRESS NOTE ADULT - PROBLEM SELECTOR PLAN 2
Fall  Telemetry  EKG no ischemic changes  PT consult Fall, reports she lost her balance and fell at home, denies chest pain/sob/dizziness/syncope  Telemetry  EKG no ischemic changes  Echo (11/18) normal LV/RV function, EF 69%, no RWMA, diastolic dysfxn (grade 1)  PT consult

## 2022-11-19 NOTE — DISCHARGE NOTE PROVIDER - NSDCMRMEDTOKEN_GEN_ALL_CORE_FT
amLODIPine 5 mg oral tablet: 2 tab(s) orally once a day  aspirin 81 mg oral tablet: 1 tab(s) orally once a day  dorzolamide 2% ophthalmic solution: 1 drop(s) to each affected eye 3 times a day  gabapentin 100 mg oral capsule: 1 cap(s) orally 3 times a day  hydroCHLOROthiazide 12.5 mg oral capsule: 1 cap(s) orally once a day  Januvia 50 mg oral tablet: 1 tab(s) orally once a day  Levemir 100 units/mL subcutaneous solution: 20 unit(s) subcutaneous once a day (at bedtime)  Lipitor 20 mg oral tablet: 1 tab(s) orally once a day   losartan 100 mg oral tablet: 1 tab(s) orally once a day  metoprolol succinate 25 mg oral tablet, extended release: 1 tab(s) orally once a day  pentoxifylline 400 mg oral tablet, extended release: 1 tab(s) orally once a day  pilocarpine 4% ophthalmic solution: 2 drop(s) to each affected eye 4 times a day  Zetia 10 mg oral tablet: 1 tab(s) orally once a day   amLODIPine 5 mg oral tablet: 2 tab(s) orally once a day  aspirin 81 mg oral tablet: 1 tab(s) orally once a day  dorzolamide 2% ophthalmic solution: 1 drop(s) to each affected eye 3 times a day  gabapentin 100 mg oral capsule: 1 cap(s) orally 3 times a day  Januvia 50 mg oral tablet: 1 tab(s) orally once a day  Levemir 100 units/mL subcutaneous solution: 20 unit(s) subcutaneous once a day (at bedtime)  Lipitor 20 mg oral tablet: 1 tab(s) orally once a day   metoprolol succinate 25 mg oral tablet, extended release: 1 tab(s) orally once a day  pentoxifylline 400 mg oral tablet, extended release: 1 tab(s) orally once a day  pilocarpine 4% ophthalmic solution: 2 drop(s) to each affected eye 4 times a day  Zetia 10 mg oral tablet: 1 tab(s) orally once a day   amLODIPine 10 mg oral tablet: 1 tab(s) orally once a day  aspirin 81 mg oral tablet: 1 tab(s) orally once a day  dorzolamide 2% ophthalmic solution: 1 drop(s) to each affected eye 3 times a day  gabapentin 100 mg oral capsule: 1 cap(s) orally 3 times a day  Januvia 50 mg oral tablet: 1 tab(s) orally once a day  Levemir 100 units/mL subcutaneous solution: 12 unit(s) subcutaneous once a day (at bedtime)   Lipitor 20 mg oral tablet: 1 tab(s) orally once a day   metoprolol succinate 25 mg oral tablet, extended release: 1 tab(s) orally once a day  pentoxifylline 400 mg oral tablet, extended release: 1 tab(s) orally once a day  pilocarpine 4% ophthalmic solution: 2 drop(s) to each affected eye 4 times a day  Zetia 10 mg oral tablet: 1 tab(s) orally once a day   amLODIPine 10 mg oral tablet: 1 tab(s) orally once a day  aspirin 81 mg oral tablet: 1 tab(s) orally once a day  dorzolamide 2% ophthalmic solution: 1 drop(s) to each affected eye 3 times a day  gabapentin 100 mg oral capsule: 1 cap(s) orally 3 times a day  Januvia 50 mg oral tablet: 1 tab(s) orally once a day  Levemir 100 units/mL subcutaneous solution: 12 unit(s) subcutaneous once a day (at bedtime)   Lipitor 20 mg oral tablet: 1 tab(s) orally once a day   metoprolol succinate 25 mg oral tablet, extended release: 1 tab(s) orally once a day  pentoxifylline 400 mg oral tablet, extended release: 1 tab(s) orally once a day  physical therapy: 1 application transdermal once a day   pilocarpine 4% ophthalmic solution: 2 drop(s) to each affected eye 4 times a day  Zetia 10 mg oral tablet: 1 tab(s) orally once a day   amLODIPine 10 mg oral tablet: 1 tab(s) orally once a day  aspirin 81 mg oral tablet: 1 tab(s) orally once a day  dorzolamide 2% ophthalmic solution: 1 drop(s) to each affected eye 3 times a day  gabapentin 100 mg oral capsule: 1 cap(s) orally 3 times a day  Januvia 50 mg oral tablet: 1 tab(s) orally once a day  Levemir 100 units/mL subcutaneous solution: 12 unit(s) subcutaneous once a day (at bedtime)   Lipitor 20 mg oral tablet: 1 tab(s) orally once a day   metoprolol succinate 25 mg oral tablet, extended release: 1 tab(s) orally once a day  pentoxifylline 400 mg oral tablet, extended release: 1 tab(s) orally once a day  physical therapy: Physical Therapy 2-3 times pern week or however many patient&#x27;s insurance allows  pilocarpine 4% ophthalmic solution: 2 drop(s) to each affected eye 4 times a day  Zetia 10 mg oral tablet: 1 tab(s) orally once a day

## 2022-11-19 NOTE — PROGRESS NOTE ADULT - PROBLEM SELECTOR PLAN 3
DM  sliding scale  hypoglycemic to 40's, reduced levemir from 20u to 15u hs  A1c 7.3%  monitor FS, hypoglycemic protocol prn  encourage po intake

## 2022-11-19 NOTE — DISCHARGE NOTE PROVIDER - NSDCQMAMI_CARD_ALL_CORE
NEW PATIENT VISIT  [facility]    REFERRING PROVIDER: Randell Nair PA-C    CHIEF COMPLAINT:   Chief Complaint   Patient presents with   • New Patient     Ref: Randell Nair LV 5/18/2021   • Neuropathy     Post Chemo x 1 year        ASSESSMENT:  1. Neuropathic pain    2. Impaired functional mobility, balance, and endurance    3. Chronic, continuous use of opioids          PLAN:  1. Medical Modalities:  -Wean off cymbalta as this has provided no benefit  -Start LDN 4 mg at bedtime  -Use percocet 5/325 mg one tab per day prn #10 per month    UDS today   Contract signed   MME less than 7.5     2. Behavioral Health Modalities:  -Will discuss mindfulness and distraction techniques at next visit    3. Other/Lifestyle Modifications:  -Look at anti inflammatory diet  -Continue aqua therapy  -[employer] disability form completed  -In future consider TENS unit with socks    4. Labs/Imaging:  -UDS    5.   Manual Therapies:  -PNE based PT work on balance/strength/gait  -In future consider acupuncture      Follow-up in 6 weeks.     Total visit time 65 minutes. In addition to face to face time, this includes time spent in review of patient's past medical documents, labs, imaging, referral notes; and coordination of care.      HISTORY OF PRESENT ILLNESS:   Patient is a 66 year old male being seen today for an GRISELDA visit with [facility] with neuropathic pain in left LE and right UE s/p chemotherapy for esophageal carcinoma stage 3.     Chemotherapy treatment started 12/2019 until 4/2020. Severe neuropathic pain 8/2020. Was getting oxycontin IR 5 mg from oncology and he used 100 tabs over 2 year period. He used medication only on severe pain days (to allow him to go fishing or ride in care to [place] to see his son). Pain is intermittent in bilateral feet to knees, upper arms, and most bothersome in his sacrum. He has a hard time finding words to describe what it feels like other than pins  and needles. Sometimes the pain lasts for min other times hours. He also has numbness and weakness in bilateral feet/legs. This causes balance issues and his need to always use a cane. He has fallen at home when he looses feeling in his lower extremities. He finds that any activity on his feet too long makes the pain and balance worse.     Pain located left LE and right UE.  Described as sharp/stabbing/buring.  NO bowel or bladder dysfunction.   Associated numbness, tingling, and weakness.   Sleep and sitting in recliner makes it better.   Trying to walk or do too much on his feet makes it worse.      AVG PAIN: 6/10  FUNCTIONAL PAIN SCORE: 8/10 (feeling very frustrated with depending on others to get stuff done)  PHQ 2: 2      SUBSTANCE ABUSE RISK EVALUATION:   ORT/UDS see MA note.  Evidence of past substance abuse (see history, problem list): None apparent.   Current/past aberrant opiate behaviors: None apparent.    [place] [facility] monitoring website reviewed?:  no abberancies noted.  High-risk psychiatric conditions: No    Social instability: None apparent.   Evidence of non-compliance in past: None apparent.   Opioid risk assessment: LOW       CURRENT MEDICAL CONDITIONS:  HTN, obestiy bmi 50, hyperlipidemia, h/o stage 3 esophageal cancer (in remission), h/o papillary carcinoma thyroid    PSYCHOSOCIAL HISTORY:  Employed: retired   Hobbies: hunting and fishing  Marital Status:   Children:yes 2 adult   Treated for addiction? : No  Ever used illegal drugs in last 5 years? : No  Smoker: former quit 1994    PAST TREATMENTS:   Surgeries/interventions-  none    Medications (ineffective/not tolerated)-  Gabapentin (not effective)  lyrica (not effective)  cymbalta (not effective/fatigue)    Adjuvants-  None tried    CURRENT MEDICATIONS FOR PAIN:   cymbalta 60 mg daily (pt finds this is not effective)  Oxycodone IR 5 mg sparingly prn    MEDICATIONS:   Per Epic record    REVIEW OF SYSTEMS:  General:  Fatigue  Skin:  None  HEENT:  Blurred vision  Cardiovascular/Pulmonary:  Shortness of breath  Gastrointestinal:  None   Genitourinary:  None  Hematological:  None  Musculoskeletal:  Joint pain and Pain shooting to arm/leg  Neurological:  Numbness  Psychological:  Insomnia    PHYSICAL EXAMINATION:  Physical Exam  Vitals and nursing note reviewed.   Constitutional:       General: He is not in acute distress.     Appearance: Normal appearance. He is obese. He is not ill-appearing, toxic-appearing or diaphoretic.   HENT:      Head: Normocephalic and atraumatic.      Mouth/Throat:      Mouth: Mucous membranes are moist.   Eyes:      General: No scleral icterus.        Right eye: No discharge.         Left eye: No discharge.      Extraocular Movements: Extraocular movements intact.      Conjunctiva/sclera: Conjunctivae normal.      Pupils: Pupils are equal, round, and reactive to light.   Musculoskeletal:         General: No swelling, deformity or signs of injury.      Cervical back: Normal range of motion and neck supple.      Right lower leg: Edema present.      Left lower leg: Edema present.      Comments: Difficulty moving sit to stand  Slow gate  Needs cane to balance    Skin:     General: Skin is warm and dry.      Comments: Purplish discoloration of skin below knees     Neurological:      General: No focal deficit present.      Mental Status: He is alert and oriented to person, place, and time.      Cranial Nerves: No cranial nerve deficit.      Sensory: No sensory deficit.      Motor: Weakness present.      Coordination: Coordination normal.      Gait: Gait abnormal.      Deep Tendon Reflexes: Reflexes normal.   Psychiatric:         Mood and Affect: Mood normal.         Behavior: Behavior normal.         Thought Content: Thought content normal.         Judgment: Judgment normal.          IMAGING & OTHER TESTING REVIEWED TODAY:  No imaging relevant to today's visit.       CC: Randell Nair PA-C   No

## 2022-11-19 NOTE — PHYSICAL THERAPY INITIAL EVALUATION ADULT - PERTINENT HX OF CURRENT PROBLEM, REHAB EVAL
This is an 87 year old female presents with fever, chills, generalized weakness , s/p fall  brought in by daughter.

## 2022-11-19 NOTE — DISCHARGE NOTE PROVIDER - CARE PROVIDER_API CALL
Michelle Serrano)  Internal Medicine  [address] [place] [address]  Phone: [phone]  Fax: [phone]  Follow Up Time: 1 week

## 2022-11-20 LAB
ALBUMIN SERPL ELPH-MCNC: 4 G/DL — SIGNIFICANT CHANGE UP (ref 3.3–5)
ALP SERPL-CCNC: 88 U/L — SIGNIFICANT CHANGE UP (ref 40–120)
ALT FLD-CCNC: 129 U/L — HIGH (ref 4–33)
ANION GAP SERPL CALC-SCNC: 11 MMOL/L — SIGNIFICANT CHANGE UP (ref 7–14)
AST SERPL-CCNC: 95 U/L — HIGH (ref 4–32)
BASOPHILS # BLD AUTO: 0.02 K/UL — SIGNIFICANT CHANGE UP (ref 0–0.2)
BASOPHILS NFR BLD AUTO: 0.3 % — SIGNIFICANT CHANGE UP (ref 0–2)
BILIRUB SERPL-MCNC: <0.2 MG/DL — SIGNIFICANT CHANGE UP (ref 0.2–1.2)
BUN SERPL-MCNC: 24 MG/DL — HIGH (ref 7–23)
CALCIUM SERPL-MCNC: 9.3 MG/DL — SIGNIFICANT CHANGE UP (ref 8.4–10.5)
CHLORIDE SERPL-SCNC: 108 MMOL/L — HIGH (ref 98–107)
CO2 SERPL-SCNC: 22 MMOL/L — SIGNIFICANT CHANGE UP (ref 22–31)
CREAT SERPL-MCNC: 1.38 MG/DL — HIGH (ref 0.5–1.3)
EGFR: 37 ML/MIN/1.73M2 — LOW
EOSINOPHIL # BLD AUTO: 0.17 K/UL — SIGNIFICANT CHANGE UP (ref 0–0.5)
EOSINOPHIL NFR BLD AUTO: 2.4 % — SIGNIFICANT CHANGE UP (ref 0–6)
GLUCOSE BLDC GLUCOMTR-MCNC: 156 MG/DL — HIGH (ref 70–99)
GLUCOSE BLDC GLUCOMTR-MCNC: 163 MG/DL — HIGH (ref 70–99)
GLUCOSE BLDC GLUCOMTR-MCNC: 214 MG/DL — HIGH (ref 70–99)
GLUCOSE BLDC GLUCOMTR-MCNC: 215 MG/DL — HIGH (ref 70–99)
GLUCOSE BLDC GLUCOMTR-MCNC: 221 MG/DL — HIGH (ref 70–99)
GLUCOSE BLDC GLUCOMTR-MCNC: 53 MG/DL — CRITICAL LOW (ref 70–99)
GLUCOSE BLDC GLUCOMTR-MCNC: 63 MG/DL — LOW (ref 70–99)
GLUCOSE SERPL-MCNC: 90 MG/DL — SIGNIFICANT CHANGE UP (ref 70–99)
HCT VFR BLD CALC: 33.9 % — LOW (ref 34.5–45)
HGB BLD-MCNC: 10.9 G/DL — LOW (ref 11.5–15.5)
IANC: 3.31 K/UL — SIGNIFICANT CHANGE UP (ref 1.8–7.4)
IMM GRANULOCYTES NFR BLD AUTO: 0.3 % — SIGNIFICANT CHANGE UP (ref 0–0.9)
LYMPHOCYTES # BLD AUTO: 3.01 K/UL — SIGNIFICANT CHANGE UP (ref 1–3.3)
LYMPHOCYTES # BLD AUTO: 41.9 % — SIGNIFICANT CHANGE UP (ref 13–44)
MAGNESIUM SERPL-MCNC: 2 MG/DL — SIGNIFICANT CHANGE UP (ref 1.6–2.6)
MCHC RBC-ENTMCNC: 30 PG — SIGNIFICANT CHANGE UP (ref 27–34)
MCHC RBC-ENTMCNC: 32.2 GM/DL — SIGNIFICANT CHANGE UP (ref 32–36)
MCV RBC AUTO: 93.4 FL — SIGNIFICANT CHANGE UP (ref 80–100)
MONOCYTES # BLD AUTO: 0.65 K/UL — SIGNIFICANT CHANGE UP (ref 0–0.9)
MONOCYTES NFR BLD AUTO: 9.1 % — SIGNIFICANT CHANGE UP (ref 2–14)
NEUTROPHILS # BLD AUTO: 3.31 K/UL — SIGNIFICANT CHANGE UP (ref 1.8–7.4)
NEUTROPHILS NFR BLD AUTO: 46 % — SIGNIFICANT CHANGE UP (ref 43–77)
NRBC # BLD: 0 /100 WBCS — SIGNIFICANT CHANGE UP (ref 0–0)
NRBC # FLD: 0 K/UL — SIGNIFICANT CHANGE UP (ref 0–0)
PHOSPHATE SERPL-MCNC: 2.6 MG/DL — SIGNIFICANT CHANGE UP (ref 2.5–4.5)
PLATELET # BLD AUTO: 199 K/UL — SIGNIFICANT CHANGE UP (ref 150–400)
POTASSIUM SERPL-MCNC: 4.3 MMOL/L — SIGNIFICANT CHANGE UP (ref 3.5–5.3)
POTASSIUM SERPL-SCNC: 4.3 MMOL/L — SIGNIFICANT CHANGE UP (ref 3.5–5.3)
PROT SERPL-MCNC: 7.3 G/DL — SIGNIFICANT CHANGE UP (ref 6–8.3)
RBC # BLD: 3.63 M/UL — LOW (ref 3.8–5.2)
RBC # FLD: 15.4 % — HIGH (ref 10.3–14.5)
SODIUM SERPL-SCNC: 141 MMOL/L — SIGNIFICANT CHANGE UP (ref 135–145)
WBC # BLD: 7.18 K/UL — SIGNIFICANT CHANGE UP (ref 3.8–10.5)
WBC # FLD AUTO: 7.18 K/UL — SIGNIFICANT CHANGE UP (ref 3.8–10.5)

## 2022-11-20 PROCEDURE — 99232 SBSQ HOSP IP/OBS MODERATE 35: CPT

## 2022-11-20 RX ORDER — INSULIN GLARGINE 100 [IU]/ML
10 INJECTION, SOLUTION SUBCUTANEOUS AT BEDTIME
Refills: 0 | Status: DISCONTINUED | OUTPATIENT
Start: 2022-11-20 | End: 2022-11-22

## 2022-11-20 RX ORDER — DEXTROSE 50 % IN WATER 50 %
12.5 SYRINGE (ML) INTRAVENOUS ONCE
Refills: 0 | Status: COMPLETED | OUTPATIENT
Start: 2022-11-20 | End: 2022-11-20

## 2022-11-20 RX ADMIN — HEPARIN SODIUM 5000 UNIT(S): 5000 INJECTION INTRAVENOUS; SUBCUTANEOUS at 22:13

## 2022-11-20 RX ADMIN — AMLODIPINE BESYLATE 10 MILLIGRAM(S): 2.5 TABLET ORAL at 06:01

## 2022-11-20 RX ADMIN — Medication 1 DROP(S): at 14:10

## 2022-11-20 RX ADMIN — CEFTRIAXONE 100 MILLIGRAM(S): 500 INJECTION, POWDER, FOR SOLUTION INTRAMUSCULAR; INTRAVENOUS at 06:02

## 2022-11-20 RX ADMIN — ATORVASTATIN CALCIUM 20 MILLIGRAM(S): 80 TABLET, FILM COATED ORAL at 22:14

## 2022-11-20 RX ADMIN — Medication 81 MILLIGRAM(S): at 14:11

## 2022-11-20 RX ADMIN — Medication 1 DROP(S): at 19:57

## 2022-11-20 RX ADMIN — DORZOLAMIDE HYDROCHLORIDE 1 DROP(S): 20 SOLUTION/ DROPS OPHTHALMIC at 14:10

## 2022-11-20 RX ADMIN — HEPARIN SODIUM 5000 UNIT(S): 5000 INJECTION INTRAVENOUS; SUBCUTANEOUS at 14:10

## 2022-11-20 RX ADMIN — HEPARIN SODIUM 5000 UNIT(S): 5000 INJECTION INTRAVENOUS; SUBCUTANEOUS at 05:57

## 2022-11-20 RX ADMIN — GABAPENTIN 100 MILLIGRAM(S): 400 CAPSULE ORAL at 14:11

## 2022-11-20 RX ADMIN — Medication 1 DROP(S): at 06:01

## 2022-11-20 RX ADMIN — Medication 2: at 17:43

## 2022-11-20 RX ADMIN — GABAPENTIN 100 MILLIGRAM(S): 400 CAPSULE ORAL at 22:13

## 2022-11-20 RX ADMIN — Medication 12.5 GRAM(S): at 08:02

## 2022-11-20 RX ADMIN — Medication 2: at 12:20

## 2022-11-20 RX ADMIN — INSULIN GLARGINE 10 UNIT(S): 100 INJECTION, SOLUTION SUBCUTANEOUS at 22:06

## 2022-11-20 RX ADMIN — Medication 3 MILLIGRAM(S): at 22:14

## 2022-11-20 RX ADMIN — GABAPENTIN 100 MILLIGRAM(S): 400 CAPSULE ORAL at 05:55

## 2022-11-20 RX ADMIN — DORZOLAMIDE HYDROCHLORIDE 1 DROP(S): 20 SOLUTION/ DROPS OPHTHALMIC at 22:13

## 2022-11-20 RX ADMIN — DORZOLAMIDE HYDROCHLORIDE 1 DROP(S): 20 SOLUTION/ DROPS OPHTHALMIC at 06:02

## 2022-11-20 RX ADMIN — Medication 400 MILLIGRAM(S): at 14:11

## 2022-11-20 RX ADMIN — Medication 25 MILLIGRAM(S): at 05:58

## 2022-11-20 NOTE — PROGRESS NOTE ADULT - PROBLEM SELECTOR PLAN 1
Generalized weakness, LLQ abdominal pain , Leukocytosis / Fever possibly due to UTI from urinary retention  COVID neg; CXR: WATSON   UA: trace protein, neg nitrite/leukocyte  WBC =11.5 86 % PMN  CT head: neg  CT abd pelvis: Moderately distended urinary bladder. Correlate clinically for urinary   retention. Mild bilateral hydroureteronephrosis to the level of the distended  urinary bladder. No obstructing urinary tract calculus.  Renal US: Mild fullness of the left renal pelvis without bilateral hydronephrosis.  UA neg, Bcx ngtd, dc CTX  Bladder scan 11/19 postvoid 0cc per nurse

## 2022-11-20 NOTE — PROGRESS NOTE ADULT - PROBLEM SELECTOR PLAN 2
Fall, reports she lost her balance and fell at home, denies chest pain/sob/dizziness/syncope  Telemetry  EKG no ischemic changes  Echo (11/18) normal LV/RV function, EF 69%, no RWMA, diastolic dysfxn (grade 1)  PT consult recs rehab  Dispo: DC plan to rehab on Monday

## 2022-11-20 NOTE — PROGRESS NOTE ADULT - SUBJECTIVE AND OBJECTIVE BOX
Dr. Cristy Renner  Pager [phone]    PROGRESS NOTE:     Patient is a 87y old  Female who presents with a chief complaint of fall (19 Nov 2022 12:40)      SUBJECTIVE / OVERNIGHT EVENTS: denies chest pain or sob   ADDITIONAL REVIEW OF SYSTEMS: afebrile , po intake acceptable per nurse , ate her food, hypoglycemic last night    MEDICATIONS  (STANDING):  amLODIPine   Tablet 10 milliGRAM(s) Oral daily  aspirin enteric coated 81 milliGRAM(s) Oral daily  atorvastatin 20 milliGRAM(s) Oral at bedtime  dextrose 5%. 1000 milliLiter(s) (50 mL/Hr) IV Continuous <Continuous>  dextrose 5%. 1000 milliLiter(s) (100 mL/Hr) IV Continuous <Continuous>  dextrose 50% Injectable 25 Gram(s) IV Push once  dextrose 50% Injectable 12.5 Gram(s) IV Push once  dextrose 50% Injectable 25 Gram(s) IV Push once  dorzolamide 2% Ophthalmic Solution 1 Drop(s) Both EYES three times a day  gabapentin 100 milliGRAM(s) Oral three times a day  glucagon  Injectable 1 milliGRAM(s) IntraMuscular once  heparin   Injectable 5000 Unit(s) SubCutaneous every 8 hours  insulin glargine Injectable (LANTUS) 10 Unit(s) SubCutaneous at bedtime  insulin lispro (ADMELOG) corrective regimen sliding scale   SubCutaneous three times a day before meals  insulin lispro (ADMELOG) corrective regimen sliding scale   SubCutaneous at bedtime  melatonin 3 milliGRAM(s) Oral at bedtime  metoprolol succinate ER 25 milliGRAM(s) Oral daily  pentoxifylline 400 milliGRAM(s) Oral daily  pilocarpine 4% Solution 1 Drop(s) Right EYE four times a day    MEDICATIONS  (PRN):  dextrose Oral Gel 15 Gram(s) Oral once PRN Blood Glucose LESS THAN 70 milliGRAM(s)/deciliter      CAPILLARY BLOOD GLUCOSE      POCT Blood Glucose.: 214 mg/dL (20 Nov 2022 12:12)  POCT Blood Glucose.: 156 mg/dL (20 Nov 2022 08:24)  POCT Blood Glucose.: 163 mg/dL (20 Nov 2022 08:23)  POCT Blood Glucose.: 63 mg/dL (20 Nov 2022 07:49)  POCT Blood Glucose.: 53 mg/dL (20 Nov 2022 07:48)  POCT Blood Glucose.: 93 mg/dL (19 Nov 2022 21:30)  POCT Blood Glucose.: 113 mg/dL (19 Nov 2022 17:07)    I&O's Summary      PHYSICAL EXAM:  Vital Signs Last 24 Hrs  T(C): 36.8 (20 Nov 2022 05:52), Max: 36.8 (20 Nov 2022 05:52)  T(F): 98.3 (20 Nov 2022 05:52), Max: 98.3 (20 Nov 2022 05:52)  HR: 64 (20 Nov 2022 05:52) (64 - 64)  BP: 157/60 (20 Nov 2022 05:52) (157/60 - 157/60)  BP(mean): --  RR: 17 (20 Nov 2022 05:52) (17 - 17)  SpO2: 100% (20 Nov 2022 05:52) (100% - 100%)        CONSTITUTIONAL: NAD, frail elderly female   RESPIRATORY: Normal respiratory effort; lungs are clear to auscultation bilaterally  CARDIOVASCULAR: Regular rate and rhythm, normal S1 and S2, no murmur/rub/gallop; No lower extremity edema; Peripheral pulses are 2+ bilaterally  ABDOMEN: Nontender to palpation, normoactive bowel sounds, no rebound/guarding; No hepatosplenomegaly  MUSCULOSKELETAL: no clubbing or cyanosis of digits; no joint swelling or tenderness to palpation  PSYCH: A+O to person, place, and time; affect appropriate      LABS:                        10.9   7.18  )-----------( 199      ( 20 Nov 2022 04:58 )             33.9     11-20    141  |  108<H>  |  24<H>  ----------------------------<  90  4.3   |  22  |  1.38<H>    Ca    9.3      20 Nov 2022 04:58  Phos  2.6     11-20  Mg     2.00     11-20    TPro  7.3  /  Alb  4.0  /  TBili  <0.2  /  DBili  x   /  AST  95<H>  /  ALT  129<H>  /  AlkPhos  88  11-20              Culture - Blood (collected 18 Nov 2022 00:50)  Source: .Blood Blood-Peripheral  Preliminary Report (19 Nov 2022 07:02):    No growth to date.    Culture - Blood (collected 18 Nov 2022 00:40)  Source: .Blood Blood-Peripheral  Preliminary Report (19 Nov 2022 07:02):    No growth to date.        RADIOLOGY & ADDITIONAL TESTS:  Results Reviewed:   Imaging Personally Reviewed:  Electrocardiogram Personally Reviewed:    COORDINATION OF CARE:  Care Discussed with Consultants/Other Providers [Y/N]:  Prior or Outpatient Records Reviewed [Y/N]:

## 2022-11-20 NOTE — PROVIDER CONTACT NOTE (HYPOGLYCEMIA EVENT) - NS PROVIDER CONTACT BACKGROUND-HYPO
Age: 87y    Gender: Female    POCT Blood Glucose:  113 mg/dL (11-19-22 @ 17:07)  233 mg/dL (11-19-22 @ 11:47)  219 mg/dL (11-19-22 @ 08:29)  229 mg/dL (11-19-22 @ 08:28)  53 mg/dL (11-19-22 @ 08:04)  42 mg/dL (11-19-22 @ 07:41)  46 mg/dL (11-19-22 @ 07:14)  46 mg/dL (11-19-22 @ 07:13)      eMAR:atorvastatin   20 milliGRAM(s) Oral (11-18-22 @ 22:06)    dextrose 50% Injectable   25 Gram(s) IV Push (11-19-22 @ 08:05)    dextrose Oral Gel   15 Gram(s) Oral (11-19-22 @ 07:46)    insulin detemir injectable (LEVEMIR)   20 Unit(s) SubCutaneous (11-18-22 @ 22:07)    insulin lispro (ADMELOG) corrective regimen sliding scale   2 Unit(s) SubCutaneous (11-19-22 @ 12:23)    insulin lispro (ADMELOG) corrective regimen sliding scale   2 Unit(s) SubCutaneous (11-18-22 @ 22:06)    
Age: 87y    Gender: Female    POCT Blood Glucose:  156 mg/dL (11-20-22 @ 08:24)  163 mg/dL (11-20-22 @ 08:23)  63 mg/dL (11-20-22 @ 07:49)  53 mg/dL (11-20-22 @ 07:48)  93 mg/dL (11-19-22 @ 21:30)  113 mg/dL (11-19-22 @ 17:07)  233 mg/dL (11-19-22 @ 11:47)      eMAR:atorvastatin   20 milliGRAM(s) Oral (11-19-22 @ 21:36)    dextrose 50% Injectable   12.5 Gram(s) IV Push (11-20-22 @ 08:02)    insulin detemir injectable (LEVEMIR)   15 Unit(s) SubCutaneous (11-19-22 @ 21:38)    insulin lispro (ADMELOG) corrective regimen sliding scale   2 Unit(s) SubCutaneous (11-19-22 @ 12:23)

## 2022-11-20 NOTE — PROVIDER CONTACT NOTE (HYPOGLYCEMIA EVENT) - NS PROVIDER CONTACT NOTE-TREATMENT-HYPO
Dextrose 50% 12.5 Grams IV Push
4 oz Fruit Juice (Specify quantity, date/time)/Glucose gel 1 tube/Dextrose 50% 25 Grams IV Push

## 2022-11-20 NOTE — PROGRESS NOTE ADULT - PROBLEM SELECTOR PLAN 3
DM2  sliding scale  hypoglycemic to 53 at 7:48, reduced basal insulin to lantus 10 units hs  A1c 7.3%  monitor FS, hypoglycemic protocol prn  encourage po intake, per nurse her intake is acceptable

## 2022-11-21 DIAGNOSIS — R79.89 OTHER SPECIFIED ABNORMAL FINDINGS OF BLOOD CHEMISTRY: ICD-10-CM

## 2022-11-21 LAB
ALBUMIN SERPL ELPH-MCNC: 4.2 G/DL — SIGNIFICANT CHANGE UP (ref 3.3–5)
ALP SERPL-CCNC: 96 U/L — SIGNIFICANT CHANGE UP (ref 40–120)
ALT FLD-CCNC: 100 U/L — HIGH (ref 4–33)
ANION GAP SERPL CALC-SCNC: 11 MMOL/L — SIGNIFICANT CHANGE UP (ref 7–14)
AST SERPL-CCNC: 53 U/L — HIGH (ref 4–32)
BASOPHILS # BLD AUTO: 0.03 K/UL — SIGNIFICANT CHANGE UP (ref 0–0.2)
BASOPHILS NFR BLD AUTO: 0.4 % — SIGNIFICANT CHANGE UP (ref 0–2)
BILIRUB SERPL-MCNC: 0.2 MG/DL — SIGNIFICANT CHANGE UP (ref 0.2–1.2)
BUN SERPL-MCNC: 18 MG/DL — SIGNIFICANT CHANGE UP (ref 7–23)
CALCIUM SERPL-MCNC: 9.6 MG/DL — SIGNIFICANT CHANGE UP (ref 8.4–10.5)
CHLORIDE SERPL-SCNC: 105 MMOL/L — SIGNIFICANT CHANGE UP (ref 98–107)
CO2 SERPL-SCNC: 23 MMOL/L — SIGNIFICANT CHANGE UP (ref 22–31)
CREAT SERPL-MCNC: 1.26 MG/DL — SIGNIFICANT CHANGE UP (ref 0.5–1.3)
EGFR: 41 ML/MIN/1.73M2 — LOW
EOSINOPHIL # BLD AUTO: 0.22 K/UL — SIGNIFICANT CHANGE UP (ref 0–0.5)
EOSINOPHIL NFR BLD AUTO: 2.9 % — SIGNIFICANT CHANGE UP (ref 0–6)
GLUCOSE BLDC GLUCOMTR-MCNC: 109 MG/DL — HIGH (ref 70–99)
GLUCOSE BLDC GLUCOMTR-MCNC: 133 MG/DL — HIGH (ref 70–99)
GLUCOSE BLDC GLUCOMTR-MCNC: 137 MG/DL — HIGH (ref 70–99)
GLUCOSE BLDC GLUCOMTR-MCNC: 218 MG/DL — HIGH (ref 70–99)
GLUCOSE BLDC GLUCOMTR-MCNC: 223 MG/DL — HIGH (ref 70–99)
GLUCOSE SERPL-MCNC: 107 MG/DL — HIGH (ref 70–99)
HAV IGM SER-ACNC: SIGNIFICANT CHANGE UP
HBV CORE IGM SER-ACNC: SIGNIFICANT CHANGE UP
HBV SURFACE AG SER-ACNC: SIGNIFICANT CHANGE UP
HCT VFR BLD CALC: 35.9 % — SIGNIFICANT CHANGE UP (ref 34.5–45)
HCV AB S/CO SERPL IA: 0.07 S/CO — SIGNIFICANT CHANGE UP (ref 0–0.99)
HCV AB SERPL-IMP: SIGNIFICANT CHANGE UP
HGB BLD-MCNC: 11.4 G/DL — LOW (ref 11.5–15.5)
IANC: 2.79 K/UL — SIGNIFICANT CHANGE UP (ref 1.8–7.4)
IMM GRANULOCYTES NFR BLD AUTO: 0.3 % — SIGNIFICANT CHANGE UP (ref 0–0.9)
LYMPHOCYTES # BLD AUTO: 3.88 K/UL — HIGH (ref 1–3.3)
LYMPHOCYTES # BLD AUTO: 51.6 % — HIGH (ref 13–44)
MAGNESIUM SERPL-MCNC: 2.3 MG/DL — SIGNIFICANT CHANGE UP (ref 1.6–2.6)
MCHC RBC-ENTMCNC: 29.5 PG — SIGNIFICANT CHANGE UP (ref 27–34)
MCHC RBC-ENTMCNC: 31.8 GM/DL — LOW (ref 32–36)
MCV RBC AUTO: 92.8 FL — SIGNIFICANT CHANGE UP (ref 80–100)
MONOCYTES # BLD AUTO: 0.58 K/UL — SIGNIFICANT CHANGE UP (ref 0–0.9)
MONOCYTES NFR BLD AUTO: 7.7 % — SIGNIFICANT CHANGE UP (ref 2–14)
NEUTROPHILS # BLD AUTO: 2.79 K/UL — SIGNIFICANT CHANGE UP (ref 1.8–7.4)
NEUTROPHILS NFR BLD AUTO: 37.1 % — LOW (ref 43–77)
NRBC # BLD: 0 /100 WBCS — SIGNIFICANT CHANGE UP (ref 0–0)
NRBC # FLD: 0 K/UL — SIGNIFICANT CHANGE UP (ref 0–0)
PHOSPHATE SERPL-MCNC: 3.1 MG/DL — SIGNIFICANT CHANGE UP (ref 2.5–4.5)
PLATELET # BLD AUTO: 213 K/UL — SIGNIFICANT CHANGE UP (ref 150–400)
POTASSIUM SERPL-MCNC: 4.1 MMOL/L — SIGNIFICANT CHANGE UP (ref 3.5–5.3)
POTASSIUM SERPL-SCNC: 4.1 MMOL/L — SIGNIFICANT CHANGE UP (ref 3.5–5.3)
PROT SERPL-MCNC: 7.3 G/DL — SIGNIFICANT CHANGE UP (ref 6–8.3)
RBC # BLD: 3.87 M/UL — SIGNIFICANT CHANGE UP (ref 3.8–5.2)
RBC # FLD: 15.1 % — HIGH (ref 10.3–14.5)
SODIUM SERPL-SCNC: 139 MMOL/L — SIGNIFICANT CHANGE UP (ref 135–145)
WBC # BLD: 7.52 K/UL — SIGNIFICANT CHANGE UP (ref 3.8–10.5)
WBC # FLD AUTO: 7.52 K/UL — SIGNIFICANT CHANGE UP (ref 3.8–10.5)

## 2022-11-21 PROCEDURE — 76705 ECHO EXAM OF ABDOMEN: CPT | Mod: 26

## 2022-11-21 PROCEDURE — 99233 SBSQ HOSP IP/OBS HIGH 50: CPT

## 2022-11-21 RX ADMIN — HEPARIN SODIUM 5000 UNIT(S): 5000 INJECTION INTRAVENOUS; SUBCUTANEOUS at 14:19

## 2022-11-21 RX ADMIN — DORZOLAMIDE HYDROCHLORIDE 1 DROP(S): 20 SOLUTION/ DROPS OPHTHALMIC at 05:12

## 2022-11-21 RX ADMIN — HEPARIN SODIUM 5000 UNIT(S): 5000 INJECTION INTRAVENOUS; SUBCUTANEOUS at 05:11

## 2022-11-21 RX ADMIN — Medication 25 MILLIGRAM(S): at 05:12

## 2022-11-21 RX ADMIN — AMLODIPINE BESYLATE 10 MILLIGRAM(S): 2.5 TABLET ORAL at 05:12

## 2022-11-21 RX ADMIN — ATORVASTATIN CALCIUM 20 MILLIGRAM(S): 80 TABLET, FILM COATED ORAL at 21:44

## 2022-11-21 RX ADMIN — Medication 1 DROP(S): at 19:47

## 2022-11-21 RX ADMIN — Medication 1 DROP(S): at 14:19

## 2022-11-21 RX ADMIN — HEPARIN SODIUM 5000 UNIT(S): 5000 INJECTION INTRAVENOUS; SUBCUTANEOUS at 21:46

## 2022-11-21 RX ADMIN — Medication 81 MILLIGRAM(S): at 14:19

## 2022-11-21 RX ADMIN — GABAPENTIN 100 MILLIGRAM(S): 400 CAPSULE ORAL at 14:19

## 2022-11-21 RX ADMIN — Medication 1 DROP(S): at 00:03

## 2022-11-21 RX ADMIN — INSULIN GLARGINE 10 UNIT(S): 100 INJECTION, SOLUTION SUBCUTANEOUS at 22:36

## 2022-11-21 RX ADMIN — GABAPENTIN 100 MILLIGRAM(S): 400 CAPSULE ORAL at 05:11

## 2022-11-21 RX ADMIN — DORZOLAMIDE HYDROCHLORIDE 1 DROP(S): 20 SOLUTION/ DROPS OPHTHALMIC at 21:44

## 2022-11-21 RX ADMIN — Medication 400 MILLIGRAM(S): at 14:19

## 2022-11-21 RX ADMIN — GABAPENTIN 100 MILLIGRAM(S): 400 CAPSULE ORAL at 21:45

## 2022-11-21 RX ADMIN — Medication 1 DROP(S): at 05:13

## 2022-11-21 RX ADMIN — Medication 2: at 12:10

## 2022-11-21 RX ADMIN — DORZOLAMIDE HYDROCHLORIDE 1 DROP(S): 20 SOLUTION/ DROPS OPHTHALMIC at 14:19

## 2022-11-21 NOTE — PROGRESS NOTE ADULT - PROBLEM SELECTOR PLAN 2
Fall, reports she lost her balance and fell at home, denies chest pain/sob/dizziness/syncope  Telemetry  EKG no ischemic changes  Echo (11/18) normal LV/RV function, EF 69%, no RWMA, diastolic dysfxn (grade 1)  PT consult recs rehab  Dispo: DC plan to rehab on Monday Fall, reports she lost her balance and fell at home, denies chest pain/sob/dizziness/syncope  Telemetry  EKG no ischemic changes  Echo (11/18) normal LV/RV function, EF 69%, no RWMA, diastolic dysfxn (grade 1)  PT consult recs rehab  Dispo: DC planning to rehab

## 2022-11-21 NOTE — PROGRESS NOTE ADULT - PROBLEM SELECTOR PLAN 3
DM2  sliding scale  hypoglycemic to 53 at 7:48, reduced basal insulin to lantus 10 units hs  A1c 7.3%  monitor FS, hypoglycemic protocol prn  encourage po intake, per nurse her intake is acceptable DM2  sliding scale  Cont lantus 10 units hs, monitor glucose  A1c 7.3%  monitor FS, hypoglycemic protocol prn  encourage po intake, per nurse her intake is acceptable

## 2022-11-21 NOTE — PROGRESS NOTE ADULT - SUBJECTIVE AND OBJECTIVE BOX
Patient is a 87y old  Female who presents with a chief complaint of fall (20 Nov 2022 12:58)      SUBJECTIVE / OVERNIGHT EVENTS: Pt seen and examined at 12N, no overnight events, tele with sinus jhony in the 50s to 60s. Pt denies any complaints.     MEDICATIONS  (STANDING):  amLODIPine   Tablet 10 milliGRAM(s) Oral daily  aspirin enteric coated 81 milliGRAM(s) Oral daily  atorvastatin 20 milliGRAM(s) Oral at bedtime  dextrose 5%. 1000 milliLiter(s) (50 mL/Hr) IV Continuous <Continuous>  dextrose 5%. 1000 milliLiter(s) (100 mL/Hr) IV Continuous <Continuous>  dextrose 50% Injectable 25 Gram(s) IV Push once  dextrose 50% Injectable 12.5 Gram(s) IV Push once  dextrose 50% Injectable 25 Gram(s) IV Push once  dorzolamide 2% Ophthalmic Solution 1 Drop(s) Both EYES three times a day  gabapentin 100 milliGRAM(s) Oral three times a day  glucagon  Injectable 1 milliGRAM(s) IntraMuscular once  heparin   Injectable 5000 Unit(s) SubCutaneous every 8 hours  insulin glargine Injectable (LANTUS) 10 Unit(s) SubCutaneous at bedtime  insulin lispro (ADMELOG) corrective regimen sliding scale   SubCutaneous three times a day before meals  insulin lispro (ADMELOG) corrective regimen sliding scale   SubCutaneous at bedtime  metoprolol succinate ER 25 milliGRAM(s) Oral daily  pentoxifylline 400 milliGRAM(s) Oral daily  pilocarpine 4% Solution 1 Drop(s) Right EYE four times a day    MEDICATIONS  (PRN):  dextrose Oral Gel 15 Gram(s) Oral once PRN Blood Glucose LESS THAN 70 milliGRAM(s)/deciliter      Vital Signs Last 24 Hrs  T(C): 36.7 (21 Nov 2022 11:10), Max: 36.7 (21 Nov 2022 04:30)  T(F): 98 (21 Nov 2022 11:10), Max: 98.1 (21 Nov 2022 04:30)  HR: 64 (21 Nov 2022 11:10) (64 - 68)  BP: 132/50 (21 Nov 2022 11:10) (132/50 - 144/67)  BP(mean): --  RR: 18 (21 Nov 2022 11:10) (18 - 18)  SpO2: 99% (21 Nov 2022 11:10) (99% - 100%)    Parameters below as of 21 Nov 2022 11:10  Patient On (Oxygen Delivery Method): room air      CAPILLARY BLOOD GLUCOSE      POCT Blood Glucose.: 218 mg/dL (21 Nov 2022 11:57)  POCT Blood Glucose.: 133 mg/dL (21 Nov 2022 07:48)  POCT Blood Glucose.: 137 mg/dL (21 Nov 2022 02:42)  POCT Blood Glucose.: 215 mg/dL (20 Nov 2022 21:27)  POCT Blood Glucose.: 221 mg/dL (20 Nov 2022 17:10)    I&O's Summary      PHYSICAL EXAM:  GENERAL: NAD, well-developed  HEAD:  Atraumatic, Normocephalic  EYES: EOMI, PERRLA, conjunctiva and sclera clear  NECK: Supple, No JVD  CHEST/LUNG: Clear to auscultation bilaterally; No wheeze  HEART: Regular rate and rhythm; No murmurs, rubs, or gallops  ABDOMEN: Soft, Nontender, Nondistended; Bowel sounds present  EXTREMITIES:  2+ Peripheral Pulses, No clubbing, cyanosis, or edema  PSYCH: AAOx3  NEUROLOGY: non-focal  SKIN: No rashes or lesions    LABS:                        11.4   7.52  )-----------( 213      ( 21 Nov 2022 06:58 )             35.9     11-21    139  |  105  |  18  ----------------------------<  107<H>  4.1   |  23  |  1.26    Ca    9.6      21 Nov 2022 06:58  Phos  3.1     11-21  Mg     2.30     11-21    TPro  7.3  /  Alb  4.2  /  TBili  0.2  /  DBili  x   /  AST  53<H>  /  ALT  100<H>  /  AlkPhos  96  11-21              RADIOLOGY & ADDITIONAL TESTS:    Imaging Personally Reviewed:    Consultant(s) Notes Reviewed:      Care Discussed with Consultants/Other Providers:   Patient is a 87y old  Female who presents with a chief complaint of fall (20 Nov 2022 12:58)      SUBJECTIVE / OVERNIGHT EVENTS: Pt seen and examined at 12N, no overnight events, tele with sinus jhony in the 50s to 60s. Pt denies any complaints. No other new issues reported.    MEDICATIONS  (STANDING):  amLODIPine   Tablet 10 milliGRAM(s) Oral daily  aspirin enteric coated 81 milliGRAM(s) Oral daily  atorvastatin 20 milliGRAM(s) Oral at bedtime  dextrose 5%. 1000 milliLiter(s) (50 mL/Hr) IV Continuous <Continuous>  dextrose 5%. 1000 milliLiter(s) (100 mL/Hr) IV Continuous <Continuous>  dextrose 50% Injectable 25 Gram(s) IV Push once  dextrose 50% Injectable 12.5 Gram(s) IV Push once  dextrose 50% Injectable 25 Gram(s) IV Push once  dorzolamide 2% Ophthalmic Solution 1 Drop(s) Both EYES three times a day  gabapentin 100 milliGRAM(s) Oral three times a day  glucagon  Injectable 1 milliGRAM(s) IntraMuscular once  heparin   Injectable 5000 Unit(s) SubCutaneous every 8 hours  insulin glargine Injectable (LANTUS) 10 Unit(s) SubCutaneous at bedtime  insulin lispro (ADMELOG) corrective regimen sliding scale   SubCutaneous three times a day before meals  insulin lispro (ADMELOG) corrective regimen sliding scale   SubCutaneous at bedtime  metoprolol succinate ER 25 milliGRAM(s) Oral daily  pentoxifylline 400 milliGRAM(s) Oral daily  pilocarpine 4% Solution 1 Drop(s) Right EYE four times a day    MEDICATIONS  (PRN):  dextrose Oral Gel 15 Gram(s) Oral once PRN Blood Glucose LESS THAN 70 milliGRAM(s)/deciliter      Vital Signs Last 24 Hrs  T(C): 36.7 (21 Nov 2022 11:10), Max: 36.7 (21 Nov 2022 04:30)  T(F): 98 (21 Nov 2022 11:10), Max: 98.1 (21 Nov 2022 04:30)  HR: 64 (21 Nov 2022 11:10) (64 - 68)  BP: 132/50 (21 Nov 2022 11:10) (132/50 - 144/67)  BP(mean): --  RR: 18 (21 Nov 2022 11:10) (18 - 18)  SpO2: 99% (21 Nov 2022 11:10) (99% - 100%)    Parameters below as of 21 Nov 2022 11:10  Patient On (Oxygen Delivery Method): room air      CAPILLARY BLOOD GLUCOSE      POCT Blood Glucose.: 218 mg/dL (21 Nov 2022 11:57)  POCT Blood Glucose.: 133 mg/dL (21 Nov 2022 07:48)  POCT Blood Glucose.: 137 mg/dL (21 Nov 2022 02:42)  POCT Blood Glucose.: 215 mg/dL (20 Nov 2022 21:27)  POCT Blood Glucose.: 221 mg/dL (20 Nov 2022 17:10)    I&O's Summary      PHYSICAL EXAM:  GENERAL: NAD, well-developed  CHEST/LUNG: Clear to auscultation bilaterally; No wheeze  HEART: Regular rate and rhythm  ABDOMEN: Soft, Nontender, Nondistended  EXTREMITIES: no LE edema  PSYCH: Calm  NEUROLOGY: AAOx3  SKIN: No rashes or lesions    LABS:                        11.4   7.52  )-----------( 213      ( 21 Nov 2022 06:58 )             35.9     11-21    139  |  105  |  18  ----------------------------<  107<H>  4.1   |  23  |  1.26    Ca    9.6      21 Nov 2022 06:58  Phos  3.1     11-21  Mg     2.30     11-21    TPro  7.3  /  Alb  4.2  /  TBili  0.2  /  DBili  x   /  AST  53<H>  /  ALT  100<H>  /  AlkPhos  96  11-21              RADIOLOGY & ADDITIONAL TESTS:  < from: US Abdomen Upper Quadrant Right (11.21.22 @ 13:59) >  US ABDOMEN RT UPR QUADRANT      < end of copied text >  < from: US Abdomen Upper Quadrant Right (11.21.22 @ 13:59) >  Unremarkable sonographic appearance of the liver.    Cholelithiasis. No biliary ductal dilatation.    < end of copied text >    Imaging Personally Reviewed:    Consultant(s) Notes Reviewed:      Care Discussed with Consultants/Other Providers:

## 2022-11-21 NOTE — PROGRESS NOTE ADULT - PROBLEM SELECTOR PLAN 6
Continue pilocarpine, dorzolamide eye drops CKD  Follow creatinine   Hold losartan for now- uncertain of baseline creatinine  Hold HCTZ for now

## 2022-11-21 NOTE — PROGRESS NOTE ADULT - PROBLEM SELECTOR PLAN 5
CKD  Follow creatinine   Hold losartan for now- uncertain of baseline creatinine  Hold HCTZ for now RUQ sono showed Unremarkable sonographic appearance of the liver. Cholelithiasis. No biliary ductal dilatation.  f/u hep panel  trend lfts

## 2022-11-21 NOTE — PROGRESS NOTE ADULT - PROBLEM SELECTOR PLAN 1
RN completed bedside change of shift report from Alyssa, RN, and assumed patient care.    Generalized weakness, LLQ abdominal pain , Leukocytosis / Fever possibly due to UTI from urinary retention  COVID neg; CXR: [facility]   UA: trace protein, neg nitrite/leukocyte  WBC =11.5 86 % PMN  CT head: neg  CT abd pelvis: Moderately distended urinary bladder. Correlate clinically for urinary   retention. Mild bilateral hydroureteronephrosis to the level of the distended  urinary bladder. No obstructing urinary tract calculus.  Renal US: Mild fullness of the left renal pelvis without bilateral hydronephrosis.  UA neg, Bcx ngtd, dc CTX  Bladder scan 11/19 postvoid 0cc per nurse Generalized weakness, LLQ abdominal pain , Leukocytosis / Fever possibly due to UTI from urinary retention  COVID neg; CXR: [facility]   UA: trace protein, neg nitrite/leukocyte  WBC =11.5 86 % PMN  CT head: neg  CT abd pelvis: Moderately distended urinary bladder. Correlate clinically for urinary   retention. Mild bilateral hydroureteronephrosis to the level of the distended  urinary bladder. No obstructing urinary tract calculus.  Renal US: Mild fullness of the left renal pelvis without bilateral hydronephrosis.  UA neg, Bcx ngtd, dc CTX  Bladder scan 11/19 postvoid 0cc per nurse    Left message for daughter Domenica on 11/21

## 2022-11-22 ENCOUNTER — TRANSCRIPTION ENCOUNTER (OUTPATIENT)
Age: 87
End: 2022-11-22

## 2022-11-22 VITALS
RESPIRATION RATE: 18 BRPM | SYSTOLIC BLOOD PRESSURE: 134 MMHG | OXYGEN SATURATION: 100 % | DIASTOLIC BLOOD PRESSURE: 50 MMHG | HEART RATE: 58 BPM | TEMPERATURE: 98 F

## 2022-11-22 DIAGNOSIS — R55 SYNCOPE AND COLLAPSE: ICD-10-CM

## 2022-11-22 LAB
ALBUMIN SERPL ELPH-MCNC: 4 G/DL — SIGNIFICANT CHANGE UP (ref 3.3–5)
ALP SERPL-CCNC: 97 U/L — SIGNIFICANT CHANGE UP (ref 40–120)
ALT FLD-CCNC: 84 U/L — HIGH (ref 4–33)
AST SERPL-CCNC: 46 U/L — HIGH (ref 4–32)
BILIRUB DIRECT SERPL-MCNC: <0.2 MG/DL — SIGNIFICANT CHANGE UP (ref 0–0.3)
BILIRUB INDIRECT FLD-MCNC: >0.1 MG/DL — SIGNIFICANT CHANGE UP (ref 0–1)
BILIRUB SERPL-MCNC: 0.3 MG/DL — SIGNIFICANT CHANGE UP (ref 0.2–1.2)
GLUCOSE BLDC GLUCOMTR-MCNC: 165 MG/DL — HIGH (ref 70–99)
GLUCOSE BLDC GLUCOMTR-MCNC: 181 MG/DL — HIGH (ref 70–99)
GLUCOSE BLDC GLUCOMTR-MCNC: 95 MG/DL — SIGNIFICANT CHANGE UP (ref 70–99)
PROT SERPL-MCNC: 7 G/DL — SIGNIFICANT CHANGE UP (ref 6–8.3)

## 2022-11-22 PROCEDURE — 99239 HOSP IP/OBS DSCHRG MGMT >30: CPT

## 2022-11-22 RX ORDER — HYDROCHLOROTHIAZIDE 25 MG
1 TABLET ORAL
Qty: 0 | Refills: 0 | DISCHARGE

## 2022-11-22 RX ORDER — INSULIN DETEMIR 100/ML (3)
12 INSULIN PEN (ML) SUBCUTANEOUS
Qty: 1 | Refills: 0
Start: 2022-11-22

## 2022-11-22 RX ORDER — AMLODIPINE BESYLATE 2.5 MG/1
1 TABLET ORAL
Qty: 0 | Refills: 0 | DISCHARGE
Start: 2022-11-22

## 2022-11-22 RX ORDER — INSULIN DETEMIR 100/ML (3)
20 INSULIN PEN (ML) SUBCUTANEOUS
Qty: 0 | Refills: 0 | DISCHARGE

## 2022-11-22 RX ADMIN — HEPARIN SODIUM 5000 UNIT(S): 5000 INJECTION INTRAVENOUS; SUBCUTANEOUS at 12:22

## 2022-11-22 RX ADMIN — Medication 400 MILLIGRAM(S): at 12:23

## 2022-11-22 RX ADMIN — Medication 1: at 16:53

## 2022-11-22 RX ADMIN — DORZOLAMIDE HYDROCHLORIDE 1 DROP(S): 20 SOLUTION/ DROPS OPHTHALMIC at 05:28

## 2022-11-22 RX ADMIN — Medication 1 DROP(S): at 05:28

## 2022-11-22 RX ADMIN — DORZOLAMIDE HYDROCHLORIDE 1 DROP(S): 20 SOLUTION/ DROPS OPHTHALMIC at 16:46

## 2022-11-22 RX ADMIN — Medication 1 DROP(S): at 13:13

## 2022-11-22 RX ADMIN — Medication 25 MILLIGRAM(S): at 05:27

## 2022-11-22 RX ADMIN — Medication 1 DROP(S): at 00:11

## 2022-11-22 RX ADMIN — AMLODIPINE BESYLATE 10 MILLIGRAM(S): 2.5 TABLET ORAL at 05:27

## 2022-11-22 RX ADMIN — GABAPENTIN 100 MILLIGRAM(S): 400 CAPSULE ORAL at 12:22

## 2022-11-22 RX ADMIN — HEPARIN SODIUM 5000 UNIT(S): 5000 INJECTION INTRAVENOUS; SUBCUTANEOUS at 05:27

## 2022-11-22 RX ADMIN — Medication 81 MILLIGRAM(S): at 12:23

## 2022-11-22 RX ADMIN — GABAPENTIN 100 MILLIGRAM(S): 400 CAPSULE ORAL at 05:27

## 2022-11-22 RX ADMIN — Medication 1: at 12:02

## 2022-11-22 NOTE — PROGRESS NOTE ADULT - SUBJECTIVE AND OBJECTIVE BOX
Patient is a 87y old  Female who presents with a chief complaint of fall (20 Nov 2022 12:58)      SUBJECTIVE / OVERNIGHT EVENTS: Pt seen and examined at 11am, no overnight events. Pt denies any complaints. Says that she does not want to go to rehab wants to go home. No other new issues reported.        MEDICATIONS  (STANDING):  amLODIPine   Tablet 10 milliGRAM(s) Oral daily  aspirin enteric coated 81 milliGRAM(s) Oral daily  atorvastatin 20 milliGRAM(s) Oral at bedtime  dextrose 5%. 1000 milliLiter(s) (50 mL/Hr) IV Continuous <Continuous>  dextrose 5%. 1000 milliLiter(s) (100 mL/Hr) IV Continuous <Continuous>  dextrose 50% Injectable 25 Gram(s) IV Push once  dextrose 50% Injectable 12.5 Gram(s) IV Push once  dextrose 50% Injectable 25 Gram(s) IV Push once  dorzolamide 2% Ophthalmic Solution 1 Drop(s) Both EYES three times a day  gabapentin 100 milliGRAM(s) Oral three times a day  glucagon  Injectable 1 milliGRAM(s) IntraMuscular once  heparin   Injectable 5000 Unit(s) SubCutaneous every 8 hours  insulin glargine Injectable (LANTUS) 10 Unit(s) SubCutaneous at bedtime  insulin lispro (ADMELOG) corrective regimen sliding scale   SubCutaneous three times a day before meals  insulin lispro (ADMELOG) corrective regimen sliding scale   SubCutaneous at bedtime  metoprolol succinate ER 25 milliGRAM(s) Oral daily  pentoxifylline 400 milliGRAM(s) Oral daily  pilocarpine 4% Solution 1 Drop(s) Right EYE four times a day    MEDICATIONS  (PRN):  dextrose Oral Gel 15 Gram(s) Oral once PRN Blood Glucose LESS THAN 70 milliGRAM(s)/deciliter      Vital Signs Last 24 Hrs  T(C): 36.7 (22 Nov 2022 11:25), Max: 36.8 (21 Nov 2022 21:15)  T(F): 98.1 (22 Nov 2022 11:25), Max: 98.3 (21 Nov 2022 21:15)  HR: 58 (22 Nov 2022 11:25) (58 - 69)  BP: 134/50 (22 Nov 2022 11:25) (129/64 - 136/61)  BP(mean): --  RR: 18 (22 Nov 2022 11:25) (18 - 18)  SpO2: 100% (22 Nov 2022 11:25) (99% - 100%)    Parameters below as of 22 Nov 2022 11:25  Patient On (Oxygen Delivery Method): room air      CAPILLARY BLOOD GLUCOSE      POCT Blood Glucose.: 165 mg/dL (22 Nov 2022 11:33)  POCT Blood Glucose.: 95 mg/dL (22 Nov 2022 07:45)  POCT Blood Glucose.: 223 mg/dL (21 Nov 2022 21:59)  POCT Blood Glucose.: 109 mg/dL (21 Nov 2022 17:13)    I&O's Summary      PHYSICAL EXAM:  GENERAL: NAD, well-developed  CHEST/LUNG: Clear to auscultation bilaterally; No wheeze  HEART: Regular rate and rhythm  ABDOMEN: Soft, Nontender, Nondistended  EXTREMITIES: no LE edema  PSYCH: Calm  NEUROLOGY: AAOx3  SKIN: No rashes or lesions    LABS:                        11.4   7.52  )-----------( 213      ( 21 Nov 2022 06:58 )             35.9     11-21    139  |  105  |  18  ----------------------------<  107<H>  4.1   |  23  |  1.26    Ca    9.6      21 Nov 2022 06:58  Phos  3.1     11-21  Mg     2.30     11-21    TPro  7.0  /  Alb  4.0  /  TBili  0.3  /  DBili  <0.2  /  AST  46<H>  /  ALT  84<H>  /  AlkPhos  97  11-22              RADIOLOGY & ADDITIONAL TESTS:    Imaging Personally Reviewed:    Consultant(s) Notes Reviewed:      Care Discussed with Consultants/Other Providers:

## 2022-11-22 NOTE — PROGRESS NOTE ADULT - PROBLEM SELECTOR PLAN 1
Generalized weakness, LLQ abdominal pain , Leukocytosis / Fever possibly due to UTI from urinary retention  COVID neg; CXR: [facility]   UA: trace protein, neg nitrite/leukocyte  WBC =11.5 86 % PMN  CT head: neg  CT abd pelvis: Moderately distended urinary bladder. Correlate clinically for urinary   retention. Mild bilateral hydroureteronephrosis to the level of the distended  urinary bladder. No obstructing urinary tract calculus.  Renal US: Mild fullness of the left renal pelvis without bilateral hydronephrosis.  UA neg, Bcx ngtd, completed 3 days of CTX, urine culture still in lab since 18th? unclear why, however pt has completed 3 days of abx  Bladder scan 11/19 postvoid 0cc per nurse    Left message for daughter Domenica on 11/21  Pt does not want to go to rehab, dc home today dc planning time spent in coordination 40mts ( preparing dc summary and plan) No

## 2022-11-22 NOTE — PROGRESS NOTE ADULT - PROBLEM SELECTOR PLAN 7
States relief from nausea. Pain feels \"like I did too many sit ups\". Continue pilocarpine, dorzolamide eye drops

## 2022-11-22 NOTE — PROGRESS NOTE ADULT - PROBLEM SELECTOR PLAN 3
DM2  sliding scale  Cont lantus 10 units hs, monitor glucose  A1c 7.3%  monitor FS, hypoglycemic protocol prn  encourage po intake, per nurse her intake is acceptable

## 2022-11-22 NOTE — PROGRESS NOTE ADULT - PROBLEM SELECTOR PLAN 2
Fall, reports she lost her balance and fell at home, denies chest pain/sob/dizziness/syncope  Telemetry  EKG no ischemic changes  Echo (11/18) normal LV/RV function, EF 69%, no RWMA, diastolic dysfxn (grade 1)  PT consult recs rehab  Dispo: [place] home

## 2022-11-22 NOTE — DISCHARGE NOTE NURSING/CASE MANAGEMENT/SOCIAL WORK - NSCORESITESY/N_GEN_A_CORE_RD
Assessment/Plan:      Diagnoses and all orders for this visit:    Viral gastroenteritis  -     ondansetron (ZOFRAN) 4 mg tablet; Take 1 tablet (4 mg total) by mouth every 8 (eight) hours as needed for nausea or vomiting  - continue to increase fluids, introduce bland food slowly  - given work excuse   - follow up and return in no improvement in 3-5 days           Subjective:     Patient ID: Shanthi Preston is a 21 y o  female  Chief Complaint   Patient presents with    Abdominal Pain     started wednesday    Vomiting     yesterday twice      HPI   Patient is a 20 yo female who presents for sick appt  She started with nausea and vomitting on Wednesday into Thursday  She has stomach upset  Today was the first day she didn't vomit and has not had a great appetite  She is drinking plenty of water and keeping it down  She is introducting bland food slowly, does not want to push it  She does not feel 100%  She missed work Wednesday and today and is supposed to go in to work tonight  No diarrhea  She feels that she may have been exposed to this at work, works in a warehouse  Review of Systems   Constitutional: Positive for appetite change and fatigue  Negative for chills and fever  HENT: Negative  Respiratory: Negative  Cardiovascular: Negative  Gastrointestinal: Positive for abdominal pain (epigatric discomfort, stomach upset), nausea and vomiting (improving)  Negative for constipation, diarrhea and rectal pain  Genitourinary: Negative  Skin: Positive for pallor  Negative for rash and wound  Neurological: Negative  Objective:  Vitals:    01/03/20 1525   BP: 120/84   Pulse: 104   Resp: 16   Temp: 97 7 °F (36 5 °C)   SpO2: 98%      Physical Exam   Constitutional: She appears well-developed and well-nourished  She does not appear ill  No distress  HENT:   Head: Normocephalic and atraumatic  Mouth/Throat: Mucous membranes are normal  Mucous membranes are not dry     Eyes: Pupils are equal, round, and reactive to light  Conjunctivae are normal    Cardiovascular: Regular rhythm and normal heart sounds     Pulmonary/Chest: Effort normal  No respiratory distress  Abdominal: Soft  She exhibits no distension  There is tenderness (slight epigastric discomfort)  There is no rebound and no guarding  Musculoskeletal: She exhibits no edema  Neurological: She is alert  Skin: Skin is warm and dry  No rash noted  Psychiatric: She has a normal mood and affect  No

## 2022-11-22 NOTE — DISCHARGE NOTE NURSING/CASE MANAGEMENT/SOCIAL WORK - NSDCVIVACCINE_GEN_ALL_CORE_FT
influenza, injectable, quadrivalent, preservative free; 10-Nov-2017 15:16; Anna Carbajal (RN); [employer]; 572kt; IntraMuscular; Dorsogluteal Left.; 0.5 milliLiter(s); VIS (VIS Published: 07-Aug-2015, VIS Presented: 10-Nov-2017);

## 2022-11-22 NOTE — DISCHARGE NOTE NURSING/CASE MANAGEMENT/SOCIAL WORK - PATIENT PORTAL LINK FT
You can access the FollowMyHealth Patient Portal offered by [facility] by registering at the following website: [email or web address]. By joining [facility]’s FollowMyHealth portal, you will also be able to view your health information using other applications (apps) compatible with our system.

## 2022-11-22 NOTE — PROGRESS NOTE ADULT - PROBLEM SELECTOR PLAN 5
RUQ sono showed Unremarkable sonographic appearance of the liver. Cholelithiasis. No biliary ductal dilatation.  f/u hep panel  trend lfts

## 2022-11-22 NOTE — PROGRESS NOTE ADULT - PROBLEM SELECTOR PLAN 4
HTN  Norvasc, lopressor  low salt, low fat, low cholesterol
Initial

## 2022-11-23 LAB
-  AMIKACIN: SIGNIFICANT CHANGE UP
-  AMOXICILLIN/CLAVULANIC ACID: SIGNIFICANT CHANGE UP
-  AMPICILLIN/SULBACTAM: SIGNIFICANT CHANGE UP
-  AMPICILLIN: SIGNIFICANT CHANGE UP
-  AZTREONAM: SIGNIFICANT CHANGE UP
-  CEFAZOLIN: SIGNIFICANT CHANGE UP
-  CEFEPIME: SIGNIFICANT CHANGE UP
-  CEFOXITIN: SIGNIFICANT CHANGE UP
-  CEFTRIAXONE: SIGNIFICANT CHANGE UP
-  CIPROFLOXACIN: SIGNIFICANT CHANGE UP
-  ERTAPENEM: SIGNIFICANT CHANGE UP
-  GENTAMICIN: SIGNIFICANT CHANGE UP
-  IMIPENEM: SIGNIFICANT CHANGE UP
-  LEVOFLOXACIN: SIGNIFICANT CHANGE UP
-  MEROPENEM: SIGNIFICANT CHANGE UP
-  NITROFURANTOIN: SIGNIFICANT CHANGE UP
-  PIPERACILLIN/TAZOBACTAM: SIGNIFICANT CHANGE UP
-  TOBRAMYCIN: SIGNIFICANT CHANGE UP
-  TRIMETHOPRIM/SULFAMETHOXAZOLE: SIGNIFICANT CHANGE UP
CULTURE RESULTS: SIGNIFICANT CHANGE UP
METHOD TYPE: SIGNIFICANT CHANGE UP
ORGANISM # SPEC MICROSCOPIC CNT: SIGNIFICANT CHANGE UP
ORGANISM # SPEC MICROSCOPIC CNT: SIGNIFICANT CHANGE UP
SPECIMEN SOURCE: SIGNIFICANT CHANGE UP

## 2023-04-10 NOTE — PATIENT PROFILE ADULT. - NSTOBACCONEVERSMOKERY/N_GEN_A
Ilumya Pregnancy And Lactation Text: The risk during pregnancy and breastfeeding is uncertain with this medication. No

## 2023-05-16 NOTE — ED PROVIDER NOTE - MDM ORDERS SUBMITTED SELECTION
See Plan of Care 5/28.   Labs/Imaging Studies/Medications Bi-Rhombic Flap Text: The defect edges were debeveled with a #15 scalpel blade. Given the location of the defect and the proximity to free margins a bi-rhombic flap was deemed most appropriate. Using a sterile surgical marker, an appropriate rhombic flap was drawn incorporating the defect. The area thus outlined was incised deep to adipose tissue with a #15 scalpel blade. The skin margins were undermined to an appropriate distance in all directions utilizing iris scissors. Following this, the designed flap was carried over into the primary defect and sutured into place.

## 2023-07-18 NOTE — H&P ADULT - VTE RISK ASSESSMENT
Bactrim  mg-160 mg oral tablet: 1 tab(s) orally every 12 hours  ibuprofen 600 mg oral tablet: 1 orally every 6 hours as needed for pain  oxyCODONE 5 mg oral tablet: 1 tab(s) orally every 6 hours as needed for  severe pain MDD: 4  Tylenol 325 mg oral tablet: 2 orally every 6 hours as needed for pain   VTE Assessment already completed for this visit

## 2023-09-26 NOTE — ED ADULT NURSE NOTE - CHIEF COMPLAINT QUOTE
Fever since last night and woke up with change in mental status Nsaids Counseling: NSAID Counseling: I discussed with the patient that NSAIDs should be taken with food. Prolonged use of NSAIDs can result in the development of stomach ulcers.  Patient advised to stop taking NSAIDs if abdominal pain occurs.  The patient verbalized understanding of the proper use and possible adverse effects of NSAIDs.  All of the patient's questions and concerns were addressed.

## 2023-12-29 NOTE — DISCHARGE NOTE NURSING/CASE MANAGEMENT/SOCIAL WORK - NSSCNAMETXT_GEN_ALL_CORE
S-(situation): PT has been sick for a week. Sx were a cold. Now pt is wanting treatment for bronchitis.    B-(background): sick for one week.    A-(assessment): Cold sx for one week. Negative for covid. Did have covid a month ago. Moved to bronchials. Breathing fine with no SOB. Wheezing with non prod cough.    R-(recommendations): [facility]. Please choose a phone or video with the 1st available provider. If they feel you need to be seen, they won't charge you.  This would get pt a virtual assessment today.  ANDREW Morgan     [facility]

## 2024-02-28 NOTE — PROGRESS NOTE ADULT - PROBLEM SELECTOR PLAN 1
Elise De La Cruz 
E. Coli (sepsis on admission), afebrile now, no leukocytosis, ID is on board, Antibiotic changed to oral Cipro today; e.coli is sensitive to it. will monitor.
E. Coli (sepsis on admission), febrile, no leukocytosis, ID note appreciated, later on pt spike fever, will add vancomycin, cont zosyn, f/u sensitivities
E. Coli (sepsis on admission), improving overall, afebrile, no leukocytosis, e.coli is sensitive to Rocephin, antibiotic downgraded to Rocephin, f/u ID recommendations.
sec to UTI   Urine Culture sensitivity  back but blood 's pending  if same: d/c on cipro till 11/16

## 2024-12-27 ENCOUNTER — NON-APPOINTMENT (OUTPATIENT)
Age: 88
End: 2024-12-27

## 2024-12-27 ENCOUNTER — APPOINTMENT (OUTPATIENT)
Dept: VASCULAR SURGERY | Facility: CLINIC | Age: 88
End: 2024-12-27
Payer: MEDICARE

## 2024-12-27 VITALS
HEART RATE: 64 BPM | BODY MASS INDEX: 27.82 KG/M2 | TEMPERATURE: 96.7 F | SYSTOLIC BLOOD PRESSURE: 149 MMHG | OXYGEN SATURATION: 98 % | HEIGHT: 65 IN | DIASTOLIC BLOOD PRESSURE: 61 MMHG | WEIGHT: 167 LBS

## 2024-12-27 DIAGNOSIS — Z79.4 TYPE 2 DIABETES MELLITUS WITH OTHER DIABETIC KIDNEY COMPLICATION: ICD-10-CM

## 2024-12-27 DIAGNOSIS — N18.9 CHRONIC KIDNEY DISEASE, UNSPECIFIED: ICD-10-CM

## 2024-12-27 DIAGNOSIS — E11.29 TYPE 2 DIABETES MELLITUS WITH OTHER DIABETIC KIDNEY COMPLICATION: ICD-10-CM

## 2024-12-27 DIAGNOSIS — I70.209 UNSPECIFIED ATHEROSCLEROSIS OF NATIVE ARTERIES OF EXTREMITIES, UNSPECIFIED EXTREMITY: ICD-10-CM

## 2024-12-27 DIAGNOSIS — E78.5 HYPERLIPIDEMIA, UNSPECIFIED: ICD-10-CM

## 2024-12-27 DIAGNOSIS — I73.9 PERIPHERAL VASCULAR DISEASE, UNSPECIFIED: ICD-10-CM

## 2024-12-27 DIAGNOSIS — I10 ESSENTIAL (PRIMARY) HYPERTENSION: ICD-10-CM

## 2024-12-27 PROCEDURE — 99205 OFFICE O/P NEW HI 60 MIN: CPT

## 2024-12-27 RX ORDER — SITAGLIPTIN 50 MG/1
50 TABLET ORAL
Refills: 0 | Status: ACTIVE | COMMUNITY

## 2024-12-27 RX ORDER — CICLOPIROX OLAMINE 7.7 MG/G
0.77 CREAM TOPICAL
Refills: 0 | Status: ACTIVE | COMMUNITY

## 2024-12-27 RX ORDER — DORZOLAMIDE HYDROCHLORIDE 20 MG/ML
2 SOLUTION OPHTHALMIC
Refills: 0 | Status: ACTIVE | COMMUNITY

## 2024-12-27 RX ORDER — HYDROCHLOROTHIAZIDE 12.5 MG/1
TABLET ORAL
Refills: 0 | Status: ACTIVE | COMMUNITY

## 2024-12-27 RX ORDER — DEXLANSOPRAZOLE 30 MG/1
30 CAPSULE, DELAYED RELEASE ORAL
Refills: 0 | Status: ACTIVE | COMMUNITY

## 2024-12-27 RX ORDER — PILOCARPINE HYDROCHLORIDE OPHTHALMIC SOLUTION 40 MG/ML
4 SOLUTION/ DROPS OPHTHALMIC
Refills: 0 | Status: ACTIVE | COMMUNITY

## 2024-12-27 RX ORDER — AMLODIPINE AND ATORVASTATIN 10; 10 MG/1; MG/1
10-10 TABLET, COATED ORAL
Refills: 0 | Status: ACTIVE | COMMUNITY

## 2024-12-27 RX ORDER — PENTOXIFYLLINE 400 MG/1
400 TABLET, EXTENDED RELEASE ORAL
Refills: 0 | Status: ACTIVE | COMMUNITY

## 2024-12-27 RX ORDER — LOSARTAN POTASSIUM 100 MG/1
100 TABLET, FILM COATED ORAL
Refills: 0 | Status: ACTIVE | COMMUNITY

## 2024-12-27 RX ORDER — INSULIN GLARGINE 300 U/ML
300 INJECTION, SOLUTION SUBCUTANEOUS
Refills: 0 | Status: ACTIVE | COMMUNITY

## 2024-12-27 RX ORDER — EZETIMIBE 10 MG/1
10 TABLET ORAL
Refills: 0 | Status: ACTIVE | COMMUNITY

## 2024-12-27 RX ORDER — ATORVASTATIN CALCIUM 20 MG/1
20 TABLET, FILM COATED ORAL
Refills: 0 | Status: ACTIVE | COMMUNITY

## 2025-01-08 ENCOUNTER — APPOINTMENT (OUTPATIENT)
Dept: VASCULAR SURGERY | Facility: CLINIC | Age: 89
End: 2025-01-08

## 2025-01-08 PROCEDURE — 93923 UPR/LXTR ART STDY 3+ LVLS: CPT

## 2025-01-17 ENCOUNTER — NON-APPOINTMENT (OUTPATIENT)
Age: 89
End: 2025-01-17

## 2025-01-17 ENCOUNTER — APPOINTMENT (OUTPATIENT)
Dept: VASCULAR SURGERY | Facility: CLINIC | Age: 89
End: 2025-01-17
Payer: MEDICARE

## 2025-01-17 VITALS
WEIGHT: 167 LBS | HEIGHT: 65 IN | SYSTOLIC BLOOD PRESSURE: 181 MMHG | HEART RATE: 63 BPM | BODY MASS INDEX: 27.82 KG/M2 | DIASTOLIC BLOOD PRESSURE: 61 MMHG | TEMPERATURE: 98.3 F | OXYGEN SATURATION: 98 %

## 2025-01-17 DIAGNOSIS — I10 ESSENTIAL (PRIMARY) HYPERTENSION: ICD-10-CM

## 2025-01-17 DIAGNOSIS — I70.209 UNSPECIFIED ATHEROSCLEROSIS OF NATIVE ARTERIES OF EXTREMITIES, UNSPECIFIED EXTREMITY: ICD-10-CM

## 2025-01-17 DIAGNOSIS — E11.29 TYPE 2 DIABETES MELLITUS WITH OTHER DIABETIC KIDNEY COMPLICATION: ICD-10-CM

## 2025-01-17 DIAGNOSIS — E78.5 HYPERLIPIDEMIA, UNSPECIFIED: ICD-10-CM

## 2025-01-17 DIAGNOSIS — Z79.4 TYPE 2 DIABETES MELLITUS WITH OTHER DIABETIC KIDNEY COMPLICATION: ICD-10-CM

## 2025-01-17 DIAGNOSIS — I73.9 PERIPHERAL VASCULAR DISEASE, UNSPECIFIED: ICD-10-CM

## 2025-01-17 PROCEDURE — 99213 OFFICE O/P EST LOW 20 MIN: CPT

## 2025-01-17 RX ORDER — CILOSTAZOL 50 MG/1
50 TABLET ORAL
Qty: 180 | Refills: 1 | Status: ACTIVE | COMMUNITY
Start: 2025-01-17 | End: 1900-01-01

## 2025-02-14 ENCOUNTER — APPOINTMENT (OUTPATIENT)
Dept: VASCULAR SURGERY | Facility: CLINIC | Age: 89
End: 2025-02-14

## 2025-02-14 VITALS
DIASTOLIC BLOOD PRESSURE: 68 MMHG | TEMPERATURE: 98.5 F | WEIGHT: 168 LBS | HEIGHT: 65 IN | BODY MASS INDEX: 27.99 KG/M2 | HEART RATE: 78 BPM | OXYGEN SATURATION: 97 % | SYSTOLIC BLOOD PRESSURE: 190 MMHG

## 2025-02-14 DIAGNOSIS — N18.9 CHRONIC KIDNEY DISEASE, UNSPECIFIED: ICD-10-CM

## 2025-02-14 DIAGNOSIS — E11.29 TYPE 2 DIABETES MELLITUS WITH OTHER DIABETIC KIDNEY COMPLICATION: ICD-10-CM

## 2025-02-14 DIAGNOSIS — Z79.4 TYPE 2 DIABETES MELLITUS WITH OTHER DIABETIC KIDNEY COMPLICATION: ICD-10-CM

## 2025-02-14 DIAGNOSIS — I70.209 UNSPECIFIED ATHEROSCLEROSIS OF NATIVE ARTERIES OF EXTREMITIES, UNSPECIFIED EXTREMITY: ICD-10-CM

## 2025-02-14 DIAGNOSIS — E78.5 HYPERLIPIDEMIA, UNSPECIFIED: ICD-10-CM

## 2025-02-14 DIAGNOSIS — I73.9 PERIPHERAL VASCULAR DISEASE, UNSPECIFIED: ICD-10-CM

## 2025-02-14 PROCEDURE — 99213 OFFICE O/P EST LOW 20 MIN: CPT
